# Patient Record
Sex: FEMALE | Race: OTHER | Employment: UNEMPLOYED | URBAN - METROPOLITAN AREA
[De-identification: names, ages, dates, MRNs, and addresses within clinical notes are randomized per-mention and may not be internally consistent; named-entity substitution may affect disease eponyms.]

---
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
## 2023-01-19 ENCOUNTER — HOSPITAL ENCOUNTER (EMERGENCY)
Age: 18
Discharge: HOME | End: 2023-01-19
Attending: STUDENT IN AN ORGANIZED HEALTH CARE EDUCATION/TRAINING PROGRAM

## 2023-01-19 ENCOUNTER — APPOINTMENT (OUTPATIENT)
Dept: ULTRASOUND IMAGING | Age: 18
End: 2023-01-19

## 2023-01-19 VITALS
TEMPERATURE: 98.7 F | OXYGEN SATURATION: 100 % | HEIGHT: 65 IN | BODY MASS INDEX: 39.89 KG/M2 | HEART RATE: 99 BPM | SYSTOLIC BLOOD PRESSURE: 131 MMHG | WEIGHT: 239.42 LBS | RESPIRATION RATE: 16 BRPM | DIASTOLIC BLOOD PRESSURE: 83 MMHG

## 2023-01-19 DIAGNOSIS — O26.891 ABDOMINAL PAIN DURING PREGNANCY IN FIRST TRIMESTER: ICD-10-CM

## 2023-01-19 DIAGNOSIS — Z3A.11 11 WEEKS GESTATION OF PREGNANCY: Primary | ICD-10-CM

## 2023-01-19 DIAGNOSIS — R10.9 ABDOMINAL PAIN DURING PREGNANCY IN FIRST TRIMESTER: ICD-10-CM

## 2023-01-19 DIAGNOSIS — R82.71 ASB (ASYMPTOMATIC BACTERIURIA): ICD-10-CM

## 2023-01-19 LAB
A/G RATIO: 1.1 (ref 1.1–2.2)
ALBUMIN SERPL-MCNC: 4.4 G/DL (ref 3.4–5)
ALP BLD-CCNC: 87 U/L (ref 40–129)
ALT SERPL-CCNC: 52 U/L (ref 10–40)
ANION GAP SERPL CALCULATED.3IONS-SCNC: 16 MMOL/L (ref 3–16)
AST SERPL-CCNC: 29 U/L (ref 15–37)
BACTERIA: ABNORMAL /HPF
BASOPHILS ABSOLUTE: 0 K/UL (ref 0–0.2)
BASOPHILS RELATIVE PERCENT: 0.4 %
BILIRUB SERPL-MCNC: 0.4 MG/DL (ref 0–1)
BILIRUBIN URINE: ABNORMAL
BLOOD, URINE: NEGATIVE
BUN BLDV-MCNC: 8 MG/DL (ref 7–20)
CALCIUM SERPL-MCNC: 10.3 MG/DL (ref 8.3–10.6)
CHLORIDE BLD-SCNC: 101 MMOL/L (ref 99–110)
CLARITY: ABNORMAL
CO2: 17 MMOL/L (ref 21–32)
COLOR: ABNORMAL
CREAT SERPL-MCNC: <0.5 MG/DL (ref 0.6–1.1)
EOSINOPHILS ABSOLUTE: 0 K/UL (ref 0–0.6)
EOSINOPHILS RELATIVE PERCENT: 0.1 %
EPITHELIAL CELLS, UA: ABNORMAL /HPF (ref 0–5)
GFR SERPL CREATININE-BSD FRML MDRD: >60 ML/MIN/{1.73_M2}
GLUCOSE BLD-MCNC: 84 MG/DL (ref 70–99)
GLUCOSE URINE: NEGATIVE MG/DL
GONADOTROPIN, CHORIONIC (HCG) QUANT: NORMAL MIU/ML
HCT VFR BLD CALC: 37.7 % (ref 36–48)
HEMOGLOBIN: 12.9 G/DL (ref 12–16)
HYALINE CASTS: ABNORMAL /LPF (ref 0–2)
KETONES, URINE: >=80 MG/DL
LEUKOCYTE ESTERASE, URINE: NEGATIVE
LYMPHOCYTES ABSOLUTE: 1.8 K/UL (ref 1–5.1)
LYMPHOCYTES RELATIVE PERCENT: 16.9 %
MCH RBC QN AUTO: 27.5 PG (ref 26–34)
MCHC RBC AUTO-ENTMCNC: 34.1 G/DL (ref 31–36)
MCV RBC AUTO: 80.8 FL (ref 80–100)
MICROSCOPIC EXAMINATION: YES
MONOCYTES ABSOLUTE: 0.6 K/UL (ref 0–1.3)
MONOCYTES RELATIVE PERCENT: 5.5 %
NEUTROPHILS ABSOLUTE: 8.3 K/UL (ref 1.7–7.7)
NEUTROPHILS RELATIVE PERCENT: 77.1 %
NITRITE, URINE: NEGATIVE
PDW BLD-RTO: 16 % (ref 12.4–15.4)
PH UA: 6 (ref 5–8)
PLATELET # BLD: 259 K/UL (ref 135–450)
PMV BLD AUTO: 8.9 FL (ref 5–10.5)
POTASSIUM REFLEX MAGNESIUM: 3.8 MMOL/L (ref 3.5–5.1)
PROTEIN UA: 30 MG/DL
RBC # BLD: 4.67 M/UL (ref 4–5.2)
RBC UA: ABNORMAL /HPF (ref 0–4)
SODIUM BLD-SCNC: 134 MMOL/L (ref 136–145)
SPECIFIC GRAVITY UA: >=1.03 (ref 1–1.03)
TOTAL PROTEIN: 8.4 G/DL (ref 6.4–8.2)
URINE REFLEX TO CULTURE: ABNORMAL
URINE TYPE: ABNORMAL
UROBILINOGEN, URINE: 2 E.U./DL
WBC # BLD: 10.7 K/UL (ref 4–11)
WBC UA: ABNORMAL /HPF (ref 0–5)

## 2023-01-19 PROCEDURE — 76801 OB US < 14 WKS SINGLE FETUS: CPT

## 2023-01-19 PROCEDURE — 84702 CHORIONIC GONADOTROPIN TEST: CPT

## 2023-01-19 PROCEDURE — 6370000000 HC RX 637 (ALT 250 FOR IP): Performed by: NURSE PRACTITIONER

## 2023-01-19 PROCEDURE — 99283 EMERGENCY DEPT VISIT LOW MDM: CPT

## 2023-01-19 PROCEDURE — 85025 COMPLETE CBC W/AUTO DIFF WBC: CPT

## 2023-01-19 PROCEDURE — 80053 COMPREHEN METABOLIC PANEL: CPT

## 2023-01-19 PROCEDURE — 81001 URINALYSIS AUTO W/SCOPE: CPT

## 2023-01-19 PROCEDURE — 36415 COLL VENOUS BLD VENIPUNCTURE: CPT

## 2023-01-19 RX ORDER — CEPHALEXIN 500 MG/1
500 CAPSULE ORAL 2 TIMES DAILY
Qty: 14 CAPSULE | Refills: 0 | Status: SHIPPED | OUTPATIENT
Start: 2023-01-19 | End: 2023-01-26

## 2023-01-19 RX ORDER — CEPHALEXIN 500 MG/1
500 CAPSULE ORAL ONCE
Status: COMPLETED | OUTPATIENT
Start: 2023-01-19 | End: 2023-01-19

## 2023-01-19 RX ADMIN — CEPHALEXIN 500 MG: 500 CAPSULE ORAL at 20:36

## 2023-01-19 ASSESSMENT — PAIN - FUNCTIONAL ASSESSMENT
PAIN_FUNCTIONAL_ASSESSMENT: 0-10
PAIN_FUNCTIONAL_ASSESSMENT: NONE - DENIES PAIN

## 2023-01-19 ASSESSMENT — PAIN DESCRIPTION - LOCATION: LOCATION: ABDOMEN

## 2023-01-19 ASSESSMENT — PAIN DESCRIPTION - PAIN TYPE: TYPE: ACUTE PAIN

## 2023-01-19 ASSESSMENT — PAIN DESCRIPTION - ORIENTATION: ORIENTATION: LOWER

## 2023-01-19 ASSESSMENT — PAIN SCALES - GENERAL: PAINLEVEL_OUTOF10: 4

## 2023-01-19 ASSESSMENT — PAIN DESCRIPTION - DESCRIPTORS: DESCRIPTORS: ACHING

## 2023-01-19 NOTE — DISCHARGE INSTRUCTIONS
Return to the emergency department or worsening symptoms including, not limited to, developing fever, chills, sweats, inability to tolerate food or drink, severe lower abdominal pain/pelvic pain, vaginal bleeding, or other symptoms/concerns. Please follow-up with the OB/GYN at the provided number to establish an OB/GYN for your prenatal care. Medication as prescribed ensuring that you complete the full course of the antibiotic-Keflex.

## 2023-01-19 NOTE — ED NOTES
Dr. Jakob Donaldson talked with Carolyn Ruiz NP at [facility]. Patient is getting transferred for DAKOTA Carmichael  01/19/23 7510

## 2023-01-19 NOTE — ED PROVIDER NOTES
Primary Care Physician: No primary care provider on file. Attending Physician: Angelica Hernandez MD     History   Chief Complaint   Patient presents with    Abdominal Pain     C/o lower abdomen pain x 4 days. She reports she is 12 weeks pregnant. She was seen here on 12/5/22 for same thing. She was sent to [facility] for [address] she was 5 weeks pregnant on 12/5/23. She denies any vaginal bleeding. She has not followed up with a OB/GYN        HPI   Vero Maloney  is a 21 y.o. female who presents complaining of left lower quadrant abdominal pain. Patient was seen here early December 2022 when she presented with abdominal pain. Labs were obtained and patient was pregnant. An ultrasound was obtained and was inconclusive. Patient was discharged home. She does have an appointment to see OB/GYN but much ending. She comes in stating that she is having lower abdominal pain. She denies any vaginal bleeding or discharge. No fevers chills but admits to some nausea. History reviewed. No pertinent past medical history. History reviewed. No pertinent surgical history. History reviewed. No pertinent family history. Social History     Socioeconomic History    Marital status: Single     Spouse name: None    Number of children: None    Years of education: None    Highest education level: None   Tobacco Use    Smoking status: Never    Smokeless tobacco: Never   Vaping Use    Vaping Use: Never used   Substance and Sexual Activity    Alcohol use: Never    Drug use: Never   Social History Narrative    ** Merged History Encounter **             Review of Systems   10 total systems reviewed and found to be negative unless otherwise noted in HPI     Physical Exam   /83   Pulse 99   Temp 98.7 °F (37.1 °C) (Tympanic)   Resp 17   Ht 5' 5\" (1.651 m)   Wt (!) 239 lb 6.7 oz (108.6 kg)   LMP 10/27/2022   SpO2 100%   BMI 39.84 kg/m²      Physical Exam  Vitals and nursing note reviewed.    Constitutional: General: She is not in acute distress. Appearance: She is well-developed. He is not diaphoretic. HENT:      Head: Normocephalic and atraumatic. Right Ear: Tympanic membrane normal.      Left Ear: Tympanic membrane normal.      Nose: Nose normal.      Mouth/Throat:      Mouth: Mucous membranes are moist.      Pharynx: Oropharynx is clear. Eyes:      Extraocular Movements: Extraocular movements intact. Conjunctiva/sclera: Conjunctivae normal.      Pupils: Pupils are equal, round, and reactive to light. Cardiovascular:      Rate and Rhythm: Normal rate and regular rhythm. Pulses: Normal pulses. Heart sounds: Normal heart sounds. No murmur heard. No gallop. Pulmonary:      Effort: Pulmonary effort is normal.      Breath sounds: Normal breath sounds. Abdominal:      General: Bowel sounds are normal. There is no distension. Palpations: Abdomen is soft. Tenderness: There is no abdominal tenderness. There is no guarding or rebound. Musculoskeletal:         General: No tenderness. Normal range of motion. Cervical back: Normal range of motion and neck supple. Right foot: Normal.      Left foot: Normal. No swelling, deformity or bony tenderness. Normal pulse. Skin:     General: Skin is warm and dry. Capillary Refill: Capillary refill takes less than 2 seconds. Findings: No bruising or erythema. Neurological:      General: No focal deficit present. Mental Status: She is alert and oriented to person, place, and time.       Gait: Gait normal.     DIAGNOSTIC RESULTS:  LABS:  Labs Reviewed   CBC WITH AUTO DIFFERENTIAL - Abnormal; Notable for the following components:       Result Value    RDW 16.0 (*)     Neutrophils Absolute 8.3 (*)     All other components within normal limits   COMPREHENSIVE METABOLIC PANEL W/ REFLEX TO MG FOR LOW K - Abnormal; Notable for the following components:    Sodium 134 (*)     CO2 17 (*)     Creatinine <0.5 (*)     Total Protein 8.4 (*)     ALT 52 (*)     All other components within normal limits   URINALYSIS WITH REFLEX TO CULTURE - Abnormal; Notable for the following components:    Color, UA DARK YELLOW (*)     Clarity, UA CLOUDY (*)     Bilirubin Urine MODERATE (*)     Ketones, Urine >=80 (*)     Protein, UA 30 (*)     Urobilinogen, Urine 2.0 (*)     All other components within normal limits   MICROSCOPIC URINALYSIS - Abnormal; Notable for the following components:    WBC, UA 6-9 (*)     Epithelial Cells, UA 11-20 (*)     Bacteria, UA Rare (*)     All other components within normal limits   HCG, QUANTITATIVE, PREGNANCY       RADIOLOGY:   Non-plain film images such as CT, Ultrasoundand MRI are read by the radiologist. Plain radiographic images are visualized and preliminarily interpreted by the emergency physician with the below findings:  US OB LESS THAN 14 WEEKS SINGLE OR FIRST GESTATION    (Results Pending)     No results found. ED BEDSIDE ULTRASOUND:   Performed by ED Physician -performed and showed an IUP    EKG: None    All other labs were withinnormal range or not returned as of this dictation       EMERGENCY DEPARTMENT COURSE and DIFFERENTIAL DIAGNOSIS/MDM:   PMH, Surgical Hx, FH, Social Hx reviewed by myself (ETOH usage, Tobacco usage,   Drug usage reviewed by myself, no pertinent Hx)- No Pertinent History     Old records were reviewed by me     Medications - No data to display     PROCEDURES:   Procedures    MDM (ASSESSMENT AND PLAN):  2005, Regis Marie is a 21 y.o. female presents complaining of left lower quadrant pain. On exam patient appears nontoxic in no acute distress but tender to palpation left lower quadrant. Given the fact the patient is pregnant I did perform a bedside ultrasound which showed an IUP with some movements. Given the fact that she is complaining of abdominal pain I wanted an official ultrasound. Patient was sent to [facility] for an official ultrasound.   Labs obtained here and showed that her hCG was significantly high which is reassuring. Nonetheless she was discharged and sent to [facility] for an official ultrasound. DDX-IUP versus ectopic pregnancy versus miscarriage  Social Determinants (Poverty, Education, uninsured) -uninsured poverty  Prior notes-PMD notes reviewed  Name and route all medications-oral Tylenol  Charting interpretations all by myself-   Diagnosis descriptions-moderate pain  Consults- None  Disposition- Considered -      I estimate there is LOW risk for COMPARTMENT SYNDROME, DEEP VENOUS THROMBOSIS, SEPTIC ARTHRITIS, TENDON OR NEUROVASCULAR INJURY, thus I consider the discharge disposition reasonable. NAME@ and I have discussed the diagnosis and risks, and we agree with discharging home to follow-up with their primary doctor or the referral orthopedist. We also discussed returning to the Emergency Department immediately if new or worsening symptoms occur. We have discussed the symptoms which are most concerning (e.g., changing or worsening pain, numbness, weakness) that necessitate immediate return. I PERSONALLY SAW THE PATIENT AND PERFORMED A SUBSTANTIVE PORTION OF THE VISIT INCLUDING ALL ASPECTS OF THE MEDICAL DECISION MAKING PROCESS. The primary clinician of record Cherl Bence, MD    ClINICAL IMPRESSION:  1.  Left upper quadrant abdominal pain          PATIENT REFERRED TO:  Tiffanie Foster MD  [address] [place] [address]    Schedule an appointment as soon as possible for a visit in 1 day      DISCHARGE MEDICATIONS:  New Prescriptions    No medications on file     DISCONTINUED MEDICATIONS:  Discontinued Medications    No medications on file     DISPOSITION Decision To Transfer 01/19/2023 04:44:52 PM    ______________________________________________________________________  __________________________________________________________________________________________  This record is transcribed utilizing voice recognition technology. There are inherent limitations in this technology. In addition, there may be limitations in editing of this report. If there are any discrepancies, please contact me directly.         Nikunj Mittal MD  01/19/23 [place]

## 2023-01-22 ASSESSMENT — ENCOUNTER SYMPTOMS
ABDOMINAL PAIN: 1
ANAL BLEEDING: 0
SORE THROAT: 0
VOMITING: 0
COUGH: 0
EYE PAIN: 0
NAUSEA: 0
DIARRHEA: 0
BACK PAIN: 0
SHORTNESS OF BREATH: 0

## 2023-01-22 NOTE — ED PROVIDER NOTES
[address] [facility]        Pt Name: Charissa Marquez  MRN: [id number]  [place] 2005  Date of evaluation: 1/19/2023  Provider: SHELLEY Conrad CNP  PCP: No primary care provider on file. Note Started: 1:33 AM EST 1/22/23      DAO. I have evaluated this patient. My supervising physician was available for consultation. Patient was seen by Frannie Bass at [facility] and sent to [facility] [place] for transvaginal ultrasound to confirm IUP. CHIEF COMPLAINT       Chief Complaint   Patient presents with    Abdominal Pain     C/o lower abdomen pain x 4 days. She reports she is 12 weeks pregnant. She was seen here on 12/5/22 for same thing. She was sent to [facility] for [address] she was 5 weeks pregnant on 12/5/23. She denies any vaginal bleeding. She has not followed up with a OB/GYN       HISTORY OF PRESENT ILLNESS: 1 or more Elements     History from : Patient    Limitations to history : None    Charissa Marquez is a 21 y.o. nontoxic, well-appearing, mildly distressed female who is sent to the emergency department here at [facility] from the emergency department at [facility] emergency department status post she is pregnant and experiencing a lower abdominal \"cramping\" rated severity of 4/10. She denies nausea, vomiting, headache, visual changes/vision, fever, chills, sweats, urinary symptoms/retention, vaginal bleeding, vaginal discharge, or other symptoms/concerns. LMP 10/27/2022. Nursing Notes were all reviewed and agreed with or any disagreements were addressed in the HPI. REVIEW OF SYSTEMS :      Review of Systems   Constitutional:  Negative for chills, diaphoresis, fatigue and fever. HENT:  Negative for congestion and sore throat. Eyes:  Negative for pain and visual disturbance. Respiratory:  Negative for cough and shortness of breath.     Cardiovascular:  Negative for chest pain and leg swelling. Gastrointestinal:  Positive for abdominal pain. Negative for anal bleeding, diarrhea, nausea and vomiting. Genitourinary:  Negative for difficulty urinating, dysuria, frequency, pelvic pain, urgency, vaginal bleeding, vaginal discharge and vaginal pain. Musculoskeletal:  Negative for back pain and neck pain. Skin:  Negative for rash and wound. Neurological:  Negative for dizziness and light-headedness. Positives and Pertinent negatives as per HPI. SURGICAL HISTORY   History reviewed. No pertinent surgical history. [address]       Discharge Medication List as of 1/19/2023  8:33 PM        CONTINUE these medications which have NOT CHANGED    Details   Prenatal Vit-Fe Fumarate-FA (PRENATAL VITAMIN) 27-0.8 MG TABS Take 1 tablet by mouth daily, Disp-30 tablet, R-0Print             ALLERGIES     Patient has no known allergies. FAMILYHISTORY     History reviewed. No pertinent family history. SOCIAL HISTORY       Social History     Tobacco Use    Smoking status: Never    Smokeless tobacco: Never   Vaping Use    Vaping Use: Never used   Substance Use Topics    Alcohol use: Never    Drug use: Never       SCREENINGS        Pledger Coma Scale  Eye Opening: Spontaneous  Best Verbal Response: Oriented  Best Motor Response: Obeys commands  Devi Coma Scale Score: 15                CIWA Assessment  BP: 131/83  Heart Rate: 99           PHYSICAL EXAM  1 or more Elements     ED Triage Vitals [01/19/23 1611]   BP Temp Temp Source Heart Rate Resp SpO2 Height Weight - Scale   131/83 98.7 °F (37.1 °C) Tympanic 99 17 100 % 5' 5\" (1.651 m) (!) 239 lb 6.7 oz (108.6 kg)       Physical Exam  Vitals and nursing note reviewed. Constitutional:       Appearance: Normal appearance. She is not diaphoretic. HENT:      Head: Normocephalic and atraumatic.       Right Ear: External ear normal.      Left Ear: External ear normal.      Nose: Nose normal.   Eyes:      General:         Right eye: No discharge. Left eye: No discharge. Pulmonary:      Effort: Pulmonary effort is normal. No respiratory distress. Abdominal:      General: Bowel sounds are normal. There is no distension or abdominal bruit. There are no signs of injury. Palpations: Abdomen is soft. Tenderness: There is abdominal tenderness in the suprapubic area. Musculoskeletal:         General: Normal range of motion. Cervical back: Normal range of motion and neck supple. Skin:     General: Skin is warm and dry. Neurological:      Mental Status: She is alert and oriented to person, place, and time. Psychiatric:         Mood and Affect: Mood normal.         Behavior: Behavior normal.         DIAGNOSTIC RESULTS   LABS:    Labs Reviewed   CBC WITH AUTO DIFFERENTIAL - Abnormal; Notable for the following components:       Result Value    RDW 16.0 (*)     Neutrophils Absolute 8.3 (*)     All other components within normal limits   COMPREHENSIVE METABOLIC PANEL W/ REFLEX TO MG FOR LOW K - Abnormal; Notable for the following components:    Sodium 134 (*)     CO2 17 (*)     Creatinine <0.5 (*)     Total Protein 8.4 (*)     ALT 52 (*)     All other components within normal limits   URINALYSIS WITH REFLEX TO CULTURE - Abnormal; Notable for the following components:    Color, UA DARK YELLOW (*)     Clarity, UA CLOUDY (*)     Bilirubin Urine MODERATE (*)     Ketones, Urine >=80 (*)     Protein, UA 30 (*)     Urobilinogen, Urine 2.0 (*)     All other components within normal limits   MICROSCOPIC URINALYSIS - Abnormal; Notable for the following components:    WBC, UA 6-9 (*)     Epithelial Cells, UA 11-20 (*)     Bacteria, UA Rare (*)     All other components within normal limits   HCG, QUANTITATIVE, PREGNANCY       When ordered only abnormal lab results are displayed. All other labs were within normal range or not returned as of this dictation. EKG:  When ordered, EKG's are interpreted by the Emergency Department Physician in the absence of a cardiologist.  Please see their note for interpretation of EKG. RADIOLOGY:   Non-plain film images such as CT, Ultrasound and MRI are read by the radiologist. Plain radiographic images are visualized and preliminarily interpreted by the ED Provider with the below findings:        Interpretation per the Radiologist below, if available at the time of this note:    US OB LESS THAN 14 [facility]   Preliminary Result   Single live intrauterine pregnancy with estimated gestational age of 7   weeks, 4 days by current ultrasound with estimated due date 08/06/2023. Nonvisualization of the right ovary. Unremarkable left ovary. US OB LESS THAN 14 WEEKS SINGLE OR FIRST GESTATION    Result Date: 1/19/2023  EXAMINATION: FIRST TRIMESTER OBSTETRIC ULTRASOUND 1/19/2023 TECHNIQUE: Transabdominal first trimester obstetric pelvic ultrasound was performed. COMPARISON: 12/03/2022 HISTORY: ORDERING SYSTEM PROVIDED HISTORY: Pregnancy with abdominal pain TECHNOLOGIST PROVIDED HISTORY: Reason for exam:->Pregnancy with abdominal pain FINDINGS: Uterus: 11.0 x 8.8 x 6.5 cm Gestational Sac(s):  Single normal appearing gestational sac. No evidence of subchorionic hemorrhage. Yolk Sac:  Present Fetal Pole:  Single fetal pole Crown Rump Length:  4.76 cm Fetal Heart Rate:  161 beats per minute Right ovary: Not visualized Left ovary: 3.8 x 2.3 x 2.7 cm. Normal arterial and venous Doppler flow. Free fluid: No significant free fluid. Measurements: Estimated gestational age by current ultrasound: 5 weeks, 4 days Estimated gestational age by LMP/prior ultrasound: 11 weeks, 5 days Estimated Due Date: 08/06/2023     Single live intrauterine pregnancy with estimated gestational age of 5 weeks, 4 days by current ultrasound with estimated due date 08/06/2023. Nonvisualization of the right ovary. Unremarkable left ovary.            PROCEDURES   Unless otherwise noted below, none Procedures    CRITICAL CARE TIME (.cctime)   0 minutes    PAST MEDICAL HISTORY      has no past medical history on file. Chronic Conditions affecting Care: None    EMERGENCY DEPARTMENT COURSE and DIFFERENTIAL DIAGNOSIS/MDM:   Vitals:    Vitals:    01/19/23 1611 01/19/23 2038   BP: 131/83    Pulse: 99    Resp: 17 16   Temp: 98.7 °F (37.1 °C)    TempSrc: Tympanic    SpO2: 100%    Weight: (!) 239 lb 6.7 oz (108.6 kg)    Height: 5' 5\" (1.651 m)      Alternate diagnoses were considered but less likely based on history and physical.  Considered symptomatic UTI, ectopic pregnancy, tubo-ovarian abscess, ovarian torsion, other. I received a call from Dr. Linda Campbell was at [address] [facility] emergency department regarding Ms. Norris Garcia. She is a 21 y.o. nontoxic, well-appearing, mildly distressed female who is sent to the emergency department here at [facility] from the emergency department at [facility] emergency department status post she is pregnant and experiencing a lower abdominal \"cramping\" rated severity of 4/10. She denies nausea, vomiting, fever, chills, sweats, urinary symptoms/retention, vaginal bleeding, vaginal discharge, or other symptoms/concerns. Labs were obtained at [facility] by Dr. Linda Campbell. Labs reveal:  hCG quant: 62,036 consistent with 11 weeks gestation of pregnancy  CMP: Mild hyponatremia 134, CO2 reduced at 17, without other electrolyte derangement, mild elevation of ALT at 52 but otherwise no elevation LFTs, otherwise unremarkable  Urine reflex to culture: Color dark yellow, clarity cloudy, bilirubin moderate, ketones >= 80, protein 30, urine bilirubin 2.0, without nitrites or leukocyte Estrace. Microscopic urinalysis: WBC 6.9, epithelials 11-20, bacteria rare potentially contaminated but will treat with pregnancy safe antibiotic for asymptomatic bacteriuria due to risk for premature rupture membranes in pregnancy. US OB less than 14 weeks:  As noted above identifies single live intrauterine pregnancy with estimated gestational age of 5 weeks and 4 days by current ultrasound with estimated due date 8/6/2023. Patient medicated emergency department as below. Patient was given the following medications:  Medications   cephALEXin (KEFLEX) capsule 500 mg (500 mg Oral Given 1/19/23 2036)             Is this patient to be included in the SEP-1 Core Measure due to severe sepsis or septic shock? No   Exclusion criteria - the patient is NOT to be included for SEP-1 Core Measure due to: Infection is not suspected    CONSULTS: (Who and What was discussed)  None  Discussion with Other Profesionals : None    Social Determinants : None    Records Reviewed : None    CC/HPI Summary, DDx, ED Course, and Reassessment: Patient resting comfortably on stretcher with eyes open with stable vital signs. She endorses no need for pain medication at this time. Discussed the asymptomatic bacteriuria and need to treat. Disposition Considerations (include 1 Tests not done, Shared Decision Making, Pt Expectation of Test or Tx.): Given that the patient is  Given that the patient does have a confirmed IUP with no vaginal bleeding. She is hemodynamically stable. She is afebrile. Therefore I feel she is both safe and appropriate discharged home with outpatient follow-up. I referred her to OB/GYN doctor Dr. Mortimer Brothers instructed her to call tomorrow to schedule appointment for evaluation. I prescribed Keflex for the asymptomatic bacteriuria. Patient given strict return precautions. Patient verbalized understands agreeable to plan for discharge and follow-up. I did prescribe Keflex as noted below for asymptomatic bacteria. I am the Primary Clinician of Record. FINAL IMPRESSION      1. 11 weeks gestation of pregnancy    2. ASB (asymptomatic bacteriuria)    3.  Abdominal pain during pregnancy in first trimester          DISPOSITION/PLAN     DISPOSITION Decision to Discharge    PATIENT REFERRED TO:  [facility] Emergency Department  [address] [id number]  [phone]  Call in 1 day      Ramya Esparza MD  [address]  [phone]    Call in 1 day      [facility]  [phone]  Call in 1 day        DISCHARGE MEDICATIONS:  Discharge Medication List as of 1/19/2023  8:33 PM        START taking these medications    Details   cephALEXin (KEFLEX) 500 MG capsule Take 1 capsule by mouth 2 times daily for 7 days, Disp-14 capsule, R-0Normal             DISCONTINUED MEDICATIONS:  Discharge Medication List as of 1/19/2023  8:33 PM                 (Please note that portions of this note were completed with a voice recognition program.  Efforts were made to edit the dictations but occasionally words are mis-transcribed.)    SHELLEY Shelton CNP (electronically signed)            SHELLEY Shelton CNP  01/22/23 0150

## 2023-01-23 ENCOUNTER — TELEPHONE (OUTPATIENT)
Dept: GYNECOLOGY | Age: 18
End: 2023-01-23

## 2023-01-23 NOTE — TELEPHONE ENCOUNTER
Patient was seen on Thursday. Was told to schedule follow up with Dr Allison Sheldon. Please advise on scheduling.  Patient can be reached at [phone]

## 2023-01-23 NOTE — TELEPHONE ENCOUNTER
Tell patient that the ER gave her the wrong doctor to follow up with. I cover for patients who are pregnant and having a miscarriage. They saw a heart beat. She needs to establish OB care somewhere.  Some groups she can contact are [facility], [facility], [facility] or she can contact her insurance to help her find an OB/GYN

## 2023-01-25 NOTE — TELEPHONE ENCOUNTER
Patient returned call, gave her information in encounter regarding her follow up, patient understood, I have her places that were suggested told her to call her insurance company to see if they are covered.  Patient understood      done

## 2023-02-02 ENCOUNTER — INITIAL PRENATAL (OUTPATIENT)
Dept: OBGYN CLINIC | Age: 18
End: 2023-02-02
Payer: COMMERCIAL

## 2023-02-02 VITALS
DIASTOLIC BLOOD PRESSURE: 80 MMHG | WEIGHT: 241.8 LBS | BODY MASS INDEX: 40.24 KG/M2 | SYSTOLIC BLOOD PRESSURE: 122 MMHG | HEART RATE: 94 BPM

## 2023-02-02 DIAGNOSIS — Z34.91 PRENATAL CARE IN FIRST TRIMESTER: Primary | ICD-10-CM

## 2023-02-02 DIAGNOSIS — O99.211 MATERNAL OBESITY SYNDROME IN FIRST TRIMESTER: ICD-10-CM

## 2023-02-02 PROBLEM — O99.210 MATERNAL OBESITY SYNDROME: Status: ACTIVE | Noted: 2023-02-02

## 2023-02-02 LAB
BASOPHILS ABSOLUTE: 0.1 K/UL (ref 0–0.2)
BASOPHILS RELATIVE PERCENT: 0.6 %
EOSINOPHILS ABSOLUTE: 0 K/UL (ref 0–0.6)
EOSINOPHILS RELATIVE PERCENT: 0.3 %
HCT VFR BLD CALC: 39.3 % (ref 36–48)
HEMOGLOBIN: 12.7 G/DL (ref 12–16)
LYMPHOCYTES ABSOLUTE: 1.9 K/UL (ref 1–5.1)
LYMPHOCYTES RELATIVE PERCENT: 20.9 %
MCH RBC QN AUTO: 27.4 PG (ref 26–34)
MCHC RBC AUTO-ENTMCNC: 32.3 G/DL (ref 31–36)
MCV RBC AUTO: 84.9 FL (ref 80–100)
MONOCYTES ABSOLUTE: 0.6 K/UL (ref 0–1.3)
MONOCYTES RELATIVE PERCENT: 6.3 %
NEUTROPHILS ABSOLUTE: 6.4 K/UL (ref 1.7–7.7)
NEUTROPHILS RELATIVE PERCENT: 71.9 %
PDW BLD-RTO: 16.7 % (ref 12.4–15.4)
PLATELET # BLD: 264 K/UL (ref 135–450)
PMV BLD AUTO: 10.3 FL (ref 5–10.5)
RBC # BLD: 4.62 M/UL (ref 4–5.2)
WBC # BLD: 8.9 K/UL (ref 4–11)

## 2023-02-02 PROCEDURE — G8427 DOCREV CUR MEDS BY ELIG CLIN: HCPCS | Performed by: OBSTETRICS & GYNECOLOGY

## 2023-02-02 PROCEDURE — G8484 FLU IMMUNIZE NO ADMIN: HCPCS | Performed by: OBSTETRICS & GYNECOLOGY

## 2023-02-02 PROCEDURE — 99203 OFFICE O/P NEW LOW 30 MIN: CPT | Performed by: OBSTETRICS & GYNECOLOGY

## 2023-02-02 PROCEDURE — G8419 CALC BMI OUT NRM PARAM NOF/U: HCPCS | Performed by: OBSTETRICS & GYNECOLOGY

## 2023-02-02 PROCEDURE — 1036F TOBACCO NON-USER: CPT | Performed by: OBSTETRICS & GYNECOLOGY

## 2023-02-02 PROCEDURE — 36415 COLL VENOUS BLD VENIPUNCTURE: CPT | Performed by: OBSTETRICS & GYNECOLOGY

## 2023-02-02 ASSESSMENT — ENCOUNTER SYMPTOMS
VOMITING: 0
DIARRHEA: 0
ABDOMINAL PAIN: 0
CONSTIPATION: 0
NAUSEA: 0

## 2023-02-02 NOTE — ASSESSMENT & PLAN NOTE
- FWB: reassuring by dat today  - Genetic screening: MQS next visit  - Anatomy scan: plan at 20 weeks with MFM  - Flu shot: discuss next visit  - Tdap: 28 weeks  - PNL: sent today

## 2023-02-02 NOTE — ASSESSMENT & PLAN NOTE
Prepregnancy BMI 40  - MFM referral for anatomy scan (sent today)  - Recommend TWG 11-20lbs  - Nutritional counseling  - Early glucose screening, repeat at 24-28 weeks  - q4wk growth US and weekly ANFS at 34 weeks for BMI > 40  - baseline HELLP labs sent today, start daily baby aspirin today as well

## 2023-02-02 NOTE — PROGRESS NOTES
OB History and Physical    CC:   Chief Complaint   Patient presents with    New Patient    Initial Prenatal Visit        HPI: Hamlet Shafer is a 21 y.o. Arunlfo Harries at 13w4d by Lacie Santiago (Estimated Date of Delivery: 23) who presents for new OB visit. Seen in ER for pregnancy confirmation in January, was having abdominal pain at that time which has since resolved. Overall feeling well, no complaints. No VB or cramps. Some nipple tenderness, no nausea/vomiting. Otherwise doing well today without issues. Maternal wellness questionnaire reviewed - no concerns today. Review of Systems:   Review of Systems   Gastrointestinal:  Negative for abdominal pain, constipation, diarrhea, nausea and vomiting. Genitourinary:  Negative for difficulty urinating, dysuria, menstrual problem, vaginal bleeding and vaginal discharge. Obstetrical History:  OB History    Para Term  AB Living   1 0 0 0 0     SAB IAB Ectopic Molar Multiple Live Births   0 0 0 0          # Outcome Date GA Lbr Melvin/2nd Weight Sex Delivery Anes PTL Lv   1 Current                Gynecologic History  Menstrual History:  LMP: unknown  Pap History:  History of abnormal pap smears: n/a  Last pap: n/a  Sexual History:   H/O STI: denies    Medical History:  No past medical history on file. Surgical History:  No past surgical history on file. Medications:  Current Outpatient Medications   Medication Sig Dispense Refill    Prenatal Vit-Fe Fumarate-FA (PRENATAL VITAMIN) 27-0.8 MG TABS Take 1 tablet by mouth daily 30 tablet 0     No current facility-administered medications for this visit.        Allergies:  No Known Allergies    Social History:  Social History     Socioeconomic History    Marital status: Single     Spouse name: None    Number of children: None    Years of education: None    Highest education level: None   Tobacco Use    Smoking status: Never    Smokeless tobacco: Never   Vaping Use    Vaping Use: Never used   Substance and Sexual Activity    Alcohol use: Never    Drug use: Never    Sexual activity: Yes     Partners: Male   Social History Narrative    ** Merged History Encounter **            FamilyHistory:  No family history on file. PhysicalExam:  /80   Pulse 94   Wt (!) 241 lb 12.8 oz (109.7 kg)   LMP 10/27/2022   BMI 40.24 kg/m²   Physical Exam  HENT:      Head: Normocephalic. Cardiovascular:      Rate and Rhythm: Normal rate. Pulmonary:      Effort: Pulmonary effort is normal. No respiratory distress. Abdominal:      Palpations: Abdomen is soft. Tenderness: There is no abdominal tenderness. There is no guarding or rebound. Skin:     General: Skin is warm and dry. Neurological:      General: No focal deficit present. Mental Status: She is alert. Psychiatric:         Mood and Affect: Mood normal.       FHT: 143 bpm via doppler    Labs:  No visits with results within 1 Day(s) from this visit.    Latest known visit with results is:   Admission on 01/19/2023, Discharged on 01/19/2023   Component Date Value    WBC 01/19/2023 10.7     RBC 01/19/2023 4.67     Hemoglobin 01/19/2023 12.9     Hematocrit 01/19/2023 37.7     MCV 01/19/2023 80.8     MCH 01/19/2023 27.5     MCHC 01/19/2023 34.1     RDW 01/19/2023 16.0 (A)     Platelets 73/74/1986 259     MPV 01/19/2023 8.9     Neutrophils % 01/19/2023 77.1     Lymphocytes % 01/19/2023 16.9     Monocytes % 01/19/2023 5.5     Eosinophils % 01/19/2023 0.1     Basophils % 01/19/2023 0.4     Neutrophils Absolute 01/19/2023 8.3 (A)     Lymphocytes Absolute 01/19/2023 1.8     Monocytes Absolute 01/19/2023 0.6     Eosinophils Absolute 01/19/2023 0.0     Basophils Absolute 01/19/2023 0.0     Sodium 01/19/2023 134 (A)     Potassium reflex Magnesi* 01/19/2023 3.8     Chloride 01/19/2023 101     CO2 01/19/2023 17 (A)     Anion Gap 01/19/2023 16     Glucose 01/19/2023 84     BUN 01/19/2023 8     Creatinine 01/19/2023 <0.5 (A)     Est, Glom Filt Rate 01/19/2023 >60 Calcium 01/19/2023 10.3     Total Protein 01/19/2023 8.4 (A)     Albumin 01/19/2023 4.4     Albumin/Globulin Ratio 01/19/2023 1.1     Total Bilirubin 01/19/2023 0.4     Alkaline Phosphatase 01/19/2023 87     ALT 01/19/2023 52 (A)     AST 01/19/2023 29     Color, UA 01/19/2023 DARK YELLOW (A)     Clarity, UA 01/19/2023 CLOUDY (A)     Glucose, Ur 01/19/2023 Negative     Bilirubin Urine 01/19/2023 MODERATE (A)     Ketones, Urine 01/19/2023 >=80 (A)     Specific Gravity, UA 01/19/2023 >=1.030     Blood, Urine 01/19/2023 Negative     pH, UA 01/19/2023 6.0     Protein, UA 01/19/2023 30 (A)     Urobilinogen, Urine 01/19/2023 2.0 (A)     Nitrite, Urine 01/19/2023 Negative     Leukocyte Esterase, Urine 01/19/2023 Negative     Microscopic Examination 01/19/2023 YES     Urine Type 01/19/2023 Voided     Urine Reflex to Culture 01/19/2023 Not Indicated     hCG Quant 01/19/2023 80841.0     Hyaline Casts, UA 01/19/2023 0-2     WBC, UA 01/19/2023 6-9 (A)     RBC, UA 01/19/2023 None seen     Epithelial Cells, UA 01/19/2023 11-20 (A)     Bacteria, UA 01/19/2023 Rare (A)        Most Recent Ultrasound:  Narrative   EXAMINATION:   FIRST TRIMESTER OBSTETRIC ULTRASOUND       1/19/2023       TECHNIQUE:   Transabdominal first trimester obstetric pelvic ultrasound was performed. COMPARISON:   12/03/2022       HISTORY:   ORDERING SYSTEM PROVIDED HISTORY: Pregnancy with abdominal pain   TECHNOLOGIST PROVIDED HISTORY:       Reason for exam:->Pregnancy with abdominal pain       FINDINGS:   Uterus: 11.0 x 8.8 x 6.5 cm       Gestational Sac(s):  Single normal appearing gestational sac. No evidence of   subchorionic hemorrhage. Yolk Sac:  Present       Fetal Pole:  Single fetal pole       Crown Rump Length:  4.76 cm       Fetal Heart Rate:  161 beats per minute       Right ovary: Not visualized       Left ovary: 3.8 x 2.3 x 2.7 cm. Normal arterial and venous Doppler flow. Free fluid: No significant free fluid. Measurements:       Estimated gestational age by current ultrasound: 5 weeks, 4 days       Estimated gestational age by LMP/prior ultrasound: 11 weeks, 5 days       Estimated Due Date: 2023           Impression   Single live intrauterine pregnancy with estimated gestational age of 7   weeks, 4 days by current ultrasound with estimated due date 2023. Nonvisualization of the right ovary. Unremarkable left ovary.                Assessment/Plan:   Aleksandr Higgins is a 21 y.o.  at 13w4d who presents for new OB visit    Problem List Items Addressed This Visit          Other    Prenatal care in first trimester - Primary     - FWB: reassuring by doptones today  - Genetic screening: MQS next visit  - Anatomy scan: plan at 20 weeks with MFM  - Flu shot: discuss next visit  - Tdap: 28 weeks  - PNL: sent today          Relevant Orders    HIV Screen    Hep C AB RLFX HCV PCR-A    Hepatitis B Surface Antigen    CBC with Auto Differential    Drug Screen Multi Urine With Bup    Rubella antibody, IgG    Varicella Zoster Antibody, IgG    Syphilis Antibody Cascading Reflex    Urinalysis with Microscopic    Culture, Urine    POCT urine pregnancy    C.trachomatis N.gonorrhoeae DNA, Urine ([facility] Only)    Type and Screen    Maternal obesity syndrome     Prepregnancy BMI 40  - [facility] referral for anatomy scan (sent today)  - Recommend TWG 11-20lbs  - Nutritional counseling  - Early glucose screening, repeat at 24-28 weeks  - q4wk growth US and weekly ANFS at 34 weeks for BMI > 40  - baseline HELLP labs sent today, start daily baby aspirin today as well          Relevant Orders    CBC with Auto Differential    Comprehensive Metabolic Panel    Protein / Creatinine Ratio, Urine       Dispo: 4 weeks for Dominique Toscano MD

## 2023-02-03 LAB
A/G RATIO: 1.3 (ref 1.1–2.2)
ABO/RH: NORMAL
ALBUMIN SERPL-MCNC: 4 G/DL (ref 3.4–5)
ALP BLD-CCNC: 78 U/L (ref 40–129)
ALT SERPL-CCNC: 59 U/L (ref 10–40)
AMPHETAMINE SCREEN, URINE: NORMAL
ANION GAP SERPL CALCULATED.3IONS-SCNC: 14 MMOL/L (ref 3–16)
ANTIBODY SCREEN: NORMAL
AST SERPL-CCNC: 26 U/L (ref 15–37)
BACTERIA: ABNORMAL /HPF
BARBITURATE SCREEN URINE: NORMAL
BENZODIAZEPINE SCREEN, URINE: NORMAL
BILIRUB SERPL-MCNC: <0.2 MG/DL (ref 0–1)
BILIRUBIN URINE: ABNORMAL
BLOOD, URINE: NEGATIVE
BUN BLDV-MCNC: 6 MG/DL (ref 7–20)
BUPRENORPHINE URINE: NORMAL
CALCIUM OXALATE CRYSTALS: PRESENT
CALCIUM SERPL-MCNC: 9.8 MG/DL (ref 8.3–10.6)
CANNABINOID SCREEN URINE: NORMAL
CHLORIDE BLD-SCNC: 101 MMOL/L (ref 99–110)
CLARITY: ABNORMAL
CO2: 21 MMOL/L (ref 21–32)
COCAINE METABOLITE SCREEN URINE: NORMAL
COLOR: ABNORMAL
CREAT SERPL-MCNC: <0.5 MG/DL (ref 0.6–1.1)
CREATININE URINE: 399.3 MG/DL (ref 28–259)
EPITHELIAL CELLS, UA: 3 /HPF (ref 0–5)
FENTANYL SCREEN, URINE: NORMAL
GFR SERPL CREATININE-BSD FRML MDRD: >60 ML/MIN/{1.73_M2}
GLUCOSE BLD-MCNC: 78 MG/DL (ref 70–99)
GLUCOSE URINE: NEGATIVE MG/DL
HEPATITIS B SURFACE ANTIGEN INTERPRETATION: NORMAL
HEPATITIS C VIRUS AB BY CIA INDEX: 0.05 IV
HEPATITIS C VIRUS AB BY CIA INTERPRETATION: NEGATIVE
HIV AG/AB: NORMAL
HIV ANTIGEN: NORMAL
HIV-1 ANTIBODY: NORMAL
HIV-2 AB: NORMAL
HYALINE CASTS: 0 /LPF (ref 0–8)
KETONES, URINE: ABNORMAL MG/DL
LEUKOCYTE ESTERASE, URINE: NEGATIVE
Lab: NORMAL
METHADONE SCREEN, URINE: NORMAL
MICROSCOPIC EXAMINATION: YES
NITRITE, URINE: NEGATIVE
OPIATE SCREEN URINE: NORMAL
OXYCODONE URINE: NORMAL
PH UA: 5.5 (ref 5–8)
PH UA: 6
PHENCYCLIDINE SCREEN URINE: NORMAL
POTASSIUM SERPL-SCNC: 4.4 MMOL/L (ref 3.5–5.1)
PROTEIN PROTEIN: 55 MG/DL
PROTEIN UA: 30 MG/DL
PROTEIN/CREAT RATIO: 0.1 MG/DL
RBC UA: 2 /HPF (ref 0–4)
RUBELLA ANTIBODY IGG: 61.2 IU/ML
SODIUM BLD-SCNC: 136 MMOL/L (ref 136–145)
SPECIFIC GRAVITY UA: 1.03 (ref 1–1.03)
TOTAL PROTEIN: 7.1 G/DL (ref 6.4–8.2)
TOTAL SYPHILLIS IGG/IGM: NORMAL
URINE TYPE: ABNORMAL
UROBILINOGEN, URINE: 1 E.U./DL
VARICELLA-ZOSTER VIRUS AB, IGG: NORMAL
WBC UA: 0 /HPF (ref 0–5)

## 2023-02-04 LAB
C. TRACHOMATIS DNA ,URINE: NEGATIVE
N. GONORRHOEAE DNA, URINE: NEGATIVE
URINE CULTURE, ROUTINE: NORMAL

## 2023-02-22 ENCOUNTER — TELEPHONE (OUTPATIENT)
Dept: OBGYN CLINIC | Age: 18
End: 2023-02-22

## 2023-02-22 NOTE — TELEPHONE ENCOUNTER
Pt's baby father calling. He states they bought a baby doppler and are unable to get a heartbeat. Pt denies any spotting, bleeding, cramping, pain. Pt able to slightly feel FM. Pt advised to call for any of the above and that relying on the home doppler at 16 weeks is not the best way to check baby well being. Also stated she was told to take ASA and had brought regular ASA, pt advised of need for 81mg baby ASA.  Please advise

## 2023-02-23 ENCOUNTER — HOSPITAL ENCOUNTER (EMERGENCY)
Age: 18
Discharge: HOME | End: 2023-02-23
Attending: STUDENT IN AN ORGANIZED HEALTH CARE EDUCATION/TRAINING PROGRAM
Payer: COMMERCIAL

## 2023-02-23 VITALS
SYSTOLIC BLOOD PRESSURE: 117 MMHG | TEMPERATURE: 98 F | OXYGEN SATURATION: 97 % | HEART RATE: 74 BPM | HEIGHT: 65 IN | WEIGHT: 238.54 LBS | BODY MASS INDEX: 39.74 KG/M2 | RESPIRATION RATE: 20 BRPM | DIASTOLIC BLOOD PRESSURE: 64 MMHG

## 2023-02-23 DIAGNOSIS — O20.0 THREATENED MISCARRIAGE: Primary | ICD-10-CM

## 2023-02-23 LAB
ALBUMIN SERPL-MCNC: 3.6 G/DL (ref 3.4–5)
ALP BLD-CCNC: 70 U/L (ref 40–129)
ALT SERPL-CCNC: 84 U/L (ref 10–40)
ANION GAP SERPL CALCULATED.3IONS-SCNC: 12 MMOL/L (ref 3–16)
AST SERPL-CCNC: 40 U/L (ref 15–37)
BASOPHILS ABSOLUTE: 0 K/UL (ref 0–0.2)
BASOPHILS RELATIVE PERCENT: 0.4 %
BILIRUB SERPL-MCNC: <0.2 MG/DL (ref 0–1)
BILIRUBIN DIRECT: <0.2 MG/DL (ref 0–0.3)
BILIRUBIN URINE: NEGATIVE
BILIRUBIN, INDIRECT: ABNORMAL MG/DL (ref 0–1)
BLOOD, URINE: NEGATIVE
BUN BLDV-MCNC: 7 MG/DL (ref 7–20)
CALCIUM SERPL-MCNC: 9.4 MG/DL (ref 8.3–10.6)
CHLORIDE BLD-SCNC: 107 MMOL/L (ref 99–110)
CLARITY: CLEAR
CO2: 20 MMOL/L (ref 21–32)
COLOR: YELLOW
CREAT SERPL-MCNC: <0.5 MG/DL (ref 0.6–1.1)
EOSINOPHILS ABSOLUTE: 0 K/UL (ref 0–0.6)
EOSINOPHILS RELATIVE PERCENT: 0.7 %
GFR SERPL CREATININE-BSD FRML MDRD: >60 ML/MIN/{1.73_M2}
GLUCOSE BLD-MCNC: 92 MG/DL (ref 70–99)
GLUCOSE URINE: NEGATIVE MG/DL
HCG(URINE) PREGNANCY TEST: POSITIVE
HCT VFR BLD CALC: 34.3 % (ref 36–48)
HEMOGLOBIN: 11.7 G/DL (ref 12–16)
KETONES, URINE: NEGATIVE MG/DL
LEUKOCYTE ESTERASE, URINE: NEGATIVE
LIPASE: 31 U/L (ref 13–60)
LYMPHOCYTES ABSOLUTE: 1.8 K/UL (ref 1–5.1)
LYMPHOCYTES RELATIVE PERCENT: 25.1 %
MCH RBC QN AUTO: 28.1 PG (ref 26–34)
MCHC RBC AUTO-ENTMCNC: 34.2 G/DL (ref 31–36)
MCV RBC AUTO: 82 FL (ref 80–100)
MICROSCOPIC EXAMINATION: NORMAL
MONOCYTES ABSOLUTE: 0.5 K/UL (ref 0–1.3)
MONOCYTES RELATIVE PERCENT: 7.3 %
NEUTROPHILS ABSOLUTE: 4.8 K/UL (ref 1.7–7.7)
NEUTROPHILS RELATIVE PERCENT: 66.5 %
NITRITE, URINE: NEGATIVE
PDW BLD-RTO: 15.9 % (ref 12.4–15.4)
PH UA: 6.5 (ref 5–8)
PLATELET # BLD: 236 K/UL (ref 135–450)
PMV BLD AUTO: 9 FL (ref 5–10.5)
POTASSIUM SERPL-SCNC: 4.2 MMOL/L (ref 3.5–5.1)
PROTEIN UA: NEGATIVE MG/DL
RBC # BLD: 4.18 M/UL (ref 4–5.2)
SODIUM BLD-SCNC: 139 MMOL/L (ref 136–145)
SPECIFIC GRAVITY UA: 1.02 (ref 1–1.03)
TOTAL PROTEIN: 6.9 G/DL (ref 6.4–8.2)
URINE REFLEX TO CULTURE: NORMAL
URINE TYPE: NORMAL
UROBILINOGEN, URINE: 1 E.U./DL
WBC # BLD: 7.2 K/UL (ref 4–11)

## 2023-02-23 PROCEDURE — 81003 URINALYSIS AUTO W/O SCOPE: CPT

## 2023-02-23 PROCEDURE — 6370000000 HC RX 637 (ALT 250 FOR IP): Performed by: STUDENT IN AN ORGANIZED HEALTH CARE EDUCATION/TRAINING PROGRAM

## 2023-02-23 PROCEDURE — 80076 HEPATIC FUNCTION PANEL: CPT

## 2023-02-23 PROCEDURE — 80048 BASIC METABOLIC PNL TOTAL CA: CPT

## 2023-02-23 PROCEDURE — 99284 EMERGENCY DEPT VISIT MOD MDM: CPT

## 2023-02-23 PROCEDURE — 83690 ASSAY OF LIPASE: CPT

## 2023-02-23 PROCEDURE — 84703 CHORIONIC GONADOTROPIN ASSAY: CPT

## 2023-02-23 PROCEDURE — 36415 COLL VENOUS BLD VENIPUNCTURE: CPT

## 2023-02-23 PROCEDURE — 85025 COMPLETE CBC W/AUTO DIFF WBC: CPT

## 2023-02-23 RX ORDER — ACETAMINOPHEN 500 MG
1000 TABLET ORAL ONCE
Status: COMPLETED | OUTPATIENT
Start: 2023-02-23 | End: 2023-02-23

## 2023-02-23 RX ADMIN — ACETAMINOPHEN 1000 MG: 500 TABLET ORAL at 21:56

## 2023-02-23 ASSESSMENT — PAIN SCALES - GENERAL
PAINLEVEL_OUTOF10: 0

## 2023-02-23 ASSESSMENT — PAIN - FUNCTIONAL ASSESSMENT
PAIN_FUNCTIONAL_ASSESSMENT: NONE - DENIES PAIN
PAIN_FUNCTIONAL_ASSESSMENT: 0-10

## 2023-02-23 ASSESSMENT — PAIN DESCRIPTION - DESCRIPTORS: DESCRIPTORS: CRAMPING

## 2023-02-23 NOTE — TELEPHONE ENCOUNTER
LM for pt to call the office. Neck Surgery   (thyroidectomy, parotidectomy, neck dissection, submandibular gland excision)  Post-operative Instructions       Please call [phone] for any questions and/or the following symptoms: fever >101.5 F, increased pain or swelling of the incision site, difficulty talking, breathing, or swallowing.     Your surgeon may place a small plastic tube in your neck called a drain.  If you go home with a drain please call the office the following morning to let the nursing staff know how much fluid is in the bulb of the drain.  They will direct you when to come into the office to have it removed.     You may notice swelling, firmness, or bruising at your incision site.  It is common for people to feel a tightness or pulling feeling at the incision when turning their head.  There may be sutures, staples or surgical glue holding the incision together.  You may clean any scabs or crusts that form on the incision with hydrogen peroxide.  After 72 hours you may get the incision wet in the shower, pat dry and apply Vaseline or a topical antibiotic ointment like bacitracin. You may apply an ice pack to the incision to help with the pain.  It is normal to have numbness around your incision site.     Please avoid strenuous exercise, and heavy lifting for 2 weeks after surgery. You may notice stiffness, or soreness in your shoulders, back or neck.  Some patients will develop tension headaches.  These symptoms can be treated with a heating pad and light massage to the affected areas.  You may notice a sore throat from the breathing tube that was placed during your surgery.  Warm or cool liquids, throat lozenges may be used for relief.  You may start driving once you are no longer taking narcotic pain medications.     Usually you may return to your regular diet after surgery.  If there is an exception your surgeon will let you know on your discharge paperwork from the hospital.     Your surgeon will indicate what home  medications to resume on your discharge paperwork from the hospital.  You may be given a prescription for narcotic pain medication (Norco, Vicodin, hydrocodone, oxycodone).  Do not drive, operate heavy equipment, climb a ladder, or drink alcohol when taking these medications.  You do not have to use these medications if you don't feel that you need them. If you are using narcotic pain medications monitor how much you are using and what you have left before the weekend.  It is easier to manage prescription refills from your surgeon during regular office hours than contacting the after hours on call surgeon.    Narcotic pain medications can cause constipation.  Stool softeners (Colace), fiber (fruits, bran, metamucil, vegetables), and fluids may help.  Non-steroidal anti-inflammatory drugs (ibuprofen, Advil, Motrin, Aleve) or acetaminophen (Tylenol) are helpful for the pain after neck surgery and may be taken.  Some of the prescription narcotic pain medications also contain acetaminophen (Tylenol).  To avoid taking too much acetaminophen take one or the other, not both.       Wound/Incision Care              Post treatment Instructions    1. Please clean the wound starting on the third post-op day, twice a day for 5 to 7 days.  Gently wash the area with soap and water and carefully pat dry.  After the area is dried, apply a thin layer of Vaseline with a Q-tip.  If the wound is in the mouth, do not follow the above instructions. Brushing the teeth is allowed after surgery, but please be careful not to hit the surgical site.      2. An appointment should be made for stitch removal in 5 to 7 days, unless otherwise arranged.     3. If you were given antibiotics, please take as directed until complete.     4. After stitches are removed, continue to gently clean and dry the wound until the area is completely healed.     5. Please watch for signs and symptoms of infection, including: localized redness increasing in size,  swelling, increasing pain, drainage, or heat.     6. When the wound area is exposed to the sun, a sunscreen with an SPF of at least 45 should be used for at least 6 months.   Your last dose of pain medication was at________________    Please call one of our nurses if you note bleeding, excessive pain, and/or fever in the post-operative period. Additionally, should you have any other questions or concerns please feel free to contact our office.    Scooter Penn MD  [address], [address]  [address]  [facility] [address]  Phone: [phone]  Toll Free: [phone]  Fax: [phone]  Home Instruction Sheet  ANESTHESIA for ADULT       What are the side effects?  Side effects depend on the medication used, and may not even be present.    Most Common Sometimes   Irritability  Poor Balance  Sleeping for Several Hours  Drowsiness  Fatigue  Difficulty Concentrating Change in Behavior  Hyperactivity  Nausea (upset stomach)  Gas (flatulence)  Dizziness  Hiccups  Constipation  Blurred Vision     The effects of sedation medicine can last up to 24 hours.  You may be drowsy or irritable for 2 to 8 hours after receiving medicine.  You may need to sleep after leaving the testing area.  Sleeping after sedation will help reduce irritability.  Diet:  Do not give anything to eat or drink until you are fully awake.  Eat a light meal and advance to a normal diet unless instructed differently:   Do not drink alcohol beverages for the next 24 hours.  Avoid greasy or spicy foods today.  Activity:  You may be dizzy and/or unsteady.  Ask for help walking, using the bathroom, or stairs to protect yourself from injury.  You should not drive a vehicle, operate machinery or power tools for 24 hours because your reflexes may be slow and your vision may be blurry.  Do not sign any legally binding documents or make important decisions for 24 hours after receiving sedation.  Medications:   Unless told otherwise, do not take  any non-prescription medications (cold medicine, etc.) for 24 hours, as these medications in combination with sedation medication, can cause increased drowsiness.  If you feel that you need over the counter medication, discuss with your physician.    If you are currently taking or are on Hormonal Contraceptives , these may be ineffective for the next 8 days following anesthesia due to interactions with medications that you may have received during surgery.  Please use additional (back up) contraception during this time.      Examples of hormonal Contraceptive:   -Birth Control Pills (oral)   -Birth Control Shots (injectable)   -Implantable contraceptives   -Patches   -Vaginal Rings      When Should I Call the Doctor?  Vomiting more than twice.  Extreme irritability or unusual changes in behavior.  Trouble arousing.  Inability to urinate.  Trouble breathing - call 911.    We would like to take this opportunity to thank you. Because your confidence in your caregivers is very important to us, it is our commitment to always treat you and your family with courtesy and respect. Our goal is to always answer any questions or worries or concerns you may have about the care you received.  If you have any suggestions for improvement or worries or concerns please do not hesitate to let us know.

## 2023-02-23 NOTE — TELEPHONE ENCOUNTER
Can we call to check on patient today? OK to add on for visit this afternoon to obtain doptones if she would like, but agree home dopplers are not reliable and I would not recommend their use this early in pregnancy. Thanks. Also agree on 81mg ASA.

## 2023-02-24 NOTE — ED PROVIDER NOTES
[facility] ENCOUNTER        Pt Name: Hamlet Shafer  MRN: [id number]  [place] 2005  Date of evaluation: 2/23/2023  Provider: Adal Nino MD  PCP: No primary care provider on file. Note Started: 7:43 AM EST 2/24/23    CHIEF COMPLAINT       Chief Complaint   Patient presents with    Vaginal Bleeding     Pt is 16 weeks pregnant started with vaginal bleeding yesterday. Pt states small amount of bright blood when she wipes. States she having a little bit of cramping since found out she was pregnant. Denies urinary symptoms. HISTORY OF PRESENT ILLNESS: 1 or more Elements     History from : Patient    Limitations to history : None    Hamlet Shafer is a 21 y.o. female who presents with vaginal bleeding x 1 day, described as spotting only. Hx of Rh +ve. Approx 16 wks pregnant. Had mild cramping yesterday and no longer. Nursing Notes were all reviewed and agreed with or any disagreements were addressed in the HPI. REVIEW OF SYSTEMS :      Positives and Pertinent negatives as per HPI. ROS otherwise unremarkable. SURGICAL HISTORY   None pertinent. [address]       Discharge Medication List as of 2/23/2023 11:21 PM        CONTINUE these medications which have NOT CHANGED    Details   Prenatal Vit-Fe Fumarate-FA (PRENATAL VITAMIN) 27-0.8 MG TABS Take 1 tablet by mouth daily, Disp-30 tablet, R-0Print             ALLERGIES     Patient has no known allergies. FAMILYHISTORY     Denies pertinent.      SOCIAL HISTORY       Social History     Tobacco Use    Smoking status: Never    Smokeless tobacco: Never   Vaping Use    Vaping Use: Never used   Substance Use Topics    Alcohol use: Never    Drug use: Never       SCREENINGS        Devi Coma Scale  Eye Opening: Spontaneous  Best Verbal Response: Oriented  Best Motor Response: Obeys commands  [place] Coma Scale Score: 15                CIWA Assessment  BP: 117/64  Heart Rate: 74           PHYSICAL EXAM  1 or more Elements     ED Triage Vitals [02/23/23 2114]   BP Temp Temp Source Heart Rate Resp SpO2 Height Weight - Scale   (!) 140/73 97.9 °F (36.6 °C) Oral 96 16 97 % 5' 5.35\" (1.66 m) (!) 238 lb 8.6 oz (108.2 kg)       Physical Exam    General: Alert, No acute distress. Eye: Normal conjunctiva. Sclera anicteric. HENT: Oral mucosa is moist.  Respiratory: Respirations even and non-labored. Cardiovascular: Normal rate, Regular rhythm. Intact peripheral pulses. Gastrointestinal: Non-distended. Soft, nttp. Fundus c/w dates. Musculoskeletal: No deformities  Integumentary: Warm, Dry. Neurologic:  No focal deficits. DIAGNOSTIC RESULTS   LABS:    Labs Reviewed   HEPATIC FUNCTION PANEL - Abnormal; Notable for the following components:       Result Value    ALT 84 (*)     AST 40 (*)     All other components within normal limits   CBC WITH AUTO DIFFERENTIAL - Abnormal; Notable for the following components:    Hemoglobin 11.7 (*)     Hematocrit 34.3 (*)     RDW 15.9 (*)     All other components within normal limits   BASIC METABOLIC PANEL - Abnormal; Notable for the following components:    CO2 20 (*)     Creatinine <0.5 (*)     All other components within normal limits   URINALYSIS WITH REFLEX TO CULTURE   PREGNANCY, URINE   LIPASE       When ordered only abnormal lab results are displayed. All other labs were within normal range or not returned as of this dictation. Point of care pregnancy exam  Procedure note:  Indication: qualitative hCG positive, quantitative hCG positive, pregnant by history, abdominal pain, pelvic pain, back pain, vaginal bleeding, syncope, hypotension, trauma, educational    Views:  Transabdominal sagittal: Adequate  Transabdominal transverse: Adequate    Images obtained with findings:   Intrauterine pregnancy: Present  Fetus present.   Fetal heart rate: 130 bpm  Fetal motion: Present    Impression:   Live intrauterine pregnancy: Present     Images obtained by me, interpreted by me. Images were saved to ultrasound machine. PAST MEDICAL HISTORY     None pertinent. EMERGENCY DEPARTMENT COURSE and DIFFERENTIAL DIAGNOSIS/MDM:   Vitals:    Vitals:    02/23/23 2114 02/23/23 2326   BP: (!) 140/73 117/64   Pulse: 96 74   Resp: 16 20   Temp: 97.9 °F (36.6 °C) 98 °F (36.7 °C)   TempSrc: Oral Oral   SpO2: 97%    Weight: (!) 238 lb 8.6 oz (108.2 kg)    Height: 5' 5.35\" (1.66 m)        Patient was given the following medications:  Medications   acetaminophen (TYLENOL) tablet 1,000 mg (1,000 mg Oral Given 2/23/23 2156)             Is this patient to be included in the SEP-1 Core Measure due to severe sepsis or septic shock? No   Exclusion criteria - the patient is NOT to be included for SEP-1 Core Measure due to:  2+ SIRS criteria are not met    Chronic Conditions: none    CONSULTS: (Who and What was discussed)  None                CC/HPI Summary, DDx, ED Course, and Reassessment: 20 yo F with vaginal spotting and cramping, now resolved, occurred yesterday, has established IUP, not ectopic, c/w threatened miscarriage, pt c/f this as unable to doppler her FHTs at home herself. POCUS of the pregnancy is reassuring. Pt is HDS, no pain, no active bleeding noted. Labwork, UA reassuring, mild transaminitis will need follow up, pt informed. No PLT abnl, not time course for preeclampsia and pt not hypertensive. Gave pelvic rest precautions, anticipatory guidance, return precautions. Pt is Rh +ve therefore need for Rhogam considered but not indicated. Recommended follow up with OB closely this week. Pt voiced understanding.       I estimate there is LOW risk for (including but not limited to) ACUTE APPENDICITIS, BOWEL OBSTRUCTION, ACUTE CHOLECYSTITIS, RUPTURED DIVERTICULITIS, INCARCERATED or STRANGULATED HERNIA, HEMMORHAGIC PANCREATITIS, PERFORATED BOWEL/ULCER, ECTOPIC PREGNANCY, OVARIAN TORSION or TUBO-OVARIAN ABSCESS thus I consider the discharge disposition reasonable. Osorio Harden (or their surrogate) and I have discussed the diagnosis and risks, and we agree with discharging home with close follow-up. We also discussed returning to the Emergency Department immediately if new or worsening symptoms occur. We have discussed the symptoms which are most concerning that necessitate immediate return. Disposition Considerations (tests considered but not done, Shared Decision Making, Pt Expectation of Test or Tx.):         I am the Primary Clinician of Record. FINAL IMPRESSION      1.  Threatened miscarriage          DISPOSITION/PLAN     DISPOSITION Decision To Discharge 02/23/2023 11:18:24 PM      PATIENT REFERRED TO:  [facility] [id number]  [phone]    If symptoms worsen    Kelly Smith MD  [facility] [address] [place] [facility]  [phone]    Schedule an appointment as soon as possible for a visit in 1 day        DISCHARGE MEDICATIONS:  Discharge Medication List as of 2/23/2023 11:21 PM          DISCONTINUED MEDICATIONS:  Discharge Medication List as of 2/23/2023 11:21 PM                 (Please note that portions of this note were completed with a voice recognition program.  Efforts were made to edit the dictations but occasionally words are mis-transcribed.)    Domingo Robin MD (electronically signed)            Domingo Robin MD  02/25/23 2227

## 2023-02-24 NOTE — ED TRIAGE NOTES
Pt states she is 16 weeks pregnant and has intermittent bleeding since yesterday. Small amount bright red blood when she wipes. Intermittent cramping since she found out she was pregnant, none right now. Pt denies urinary symptoms.

## 2023-02-27 SDOH — ECONOMIC STABILITY: FOOD INSECURITY: WITHIN THE PAST 12 MONTHS, YOU WORRIED THAT YOUR FOOD WOULD RUN OUT BEFORE YOU GOT MONEY TO BUY MORE.: NEVER TRUE

## 2023-02-27 SDOH — ECONOMIC STABILITY: TRANSPORTATION INSECURITY
IN THE PAST 12 MONTHS, HAS LACK OF TRANSPORTATION KEPT YOU FROM MEETINGS, WORK, OR FROM GETTING THINGS NEEDED FOR DAILY LIVING?: PATIENT DECLINED

## 2023-02-27 SDOH — ECONOMIC STABILITY: HOUSING INSECURITY
IN THE LAST 12 MONTHS, WAS THERE A TIME WHEN YOU DID NOT HAVE A STEADY PLACE TO SLEEP OR SLEPT IN A SHELTER (INCLUDING NOW)?: NO

## 2023-02-27 SDOH — ECONOMIC STABILITY: FOOD INSECURITY: WITHIN THE PAST 12 MONTHS, THE FOOD YOU BOUGHT JUST DIDN'T LAST AND YOU DIDN'T HAVE MONEY TO GET MORE.: NEVER TRUE

## 2023-02-27 SDOH — ECONOMIC STABILITY: INCOME INSECURITY: HOW HARD IS IT FOR YOU TO PAY FOR THE VERY BASICS LIKE FOOD, HOUSING, MEDICAL CARE, AND HEATING?: NOT HARD AT ALL

## 2023-03-02 ENCOUNTER — ROUTINE PRENATAL (OUTPATIENT)
Dept: OBGYN CLINIC | Age: 18
End: 2023-03-02
Payer: COMMERCIAL

## 2023-03-02 VITALS
WEIGHT: 235 LBS | SYSTOLIC BLOOD PRESSURE: 114 MMHG | DIASTOLIC BLOOD PRESSURE: 80 MMHG | BODY MASS INDEX: 38.68 KG/M2 | HEART RATE: 94 BPM

## 2023-03-02 DIAGNOSIS — O99.212 MATERNAL OBESITY SYNDROME IN SECOND TRIMESTER: ICD-10-CM

## 2023-03-02 DIAGNOSIS — Z34.92 PRENATAL CARE IN SECOND TRIMESTER: Primary | ICD-10-CM

## 2023-03-02 PROCEDURE — G8419 CALC BMI OUT NRM PARAM NOF/U: HCPCS | Performed by: OBSTETRICS & GYNECOLOGY

## 2023-03-02 PROCEDURE — G8484 FLU IMMUNIZE NO ADMIN: HCPCS | Performed by: OBSTETRICS & GYNECOLOGY

## 2023-03-02 PROCEDURE — 36415 COLL VENOUS BLD VENIPUNCTURE: CPT | Performed by: OBSTETRICS & GYNECOLOGY

## 2023-03-02 PROCEDURE — 1036F TOBACCO NON-USER: CPT | Performed by: OBSTETRICS & GYNECOLOGY

## 2023-03-02 PROCEDURE — 99213 OFFICE O/P EST LOW 20 MIN: CPT | Performed by: OBSTETRICS & GYNECOLOGY

## 2023-03-02 PROCEDURE — G8427 DOCREV CUR MEDS BY ELIG CLIN: HCPCS | Performed by: OBSTETRICS & GYNECOLOGY

## 2023-03-02 NOTE — ASSESSMENT & PLAN NOTE
Prepregnancy BMI 40  - MFM referral for anatomy scan   - Recommend TWG 11-20lbs  - Nutritional counseling  - Early glucose screening today, repeat at 24-28 weeks  - q4wk growth US and weekly ANFS at 34 weeks for BMI > 40  - baseline HELLP labs wnl, on daily baby aspirin as well

## 2023-03-02 NOTE — PROGRESS NOTES
Return OB Office Visit    CC:   Chief Complaint   Patient presents with    Routine Prenatal Visit       HPI:  Pt seen and examined. No concerns/complaints. Denies VB, LOF, some mild cramps. Some small fetal movements/flutters. Patient was seen in the ER on 2/23/23 for bleeding, was just a small amount of spotting. Nothing since then. US normal at that time. Maternal wellness questionnaire reviewed - no concerns today. Score 2.      Objective:  /80   Pulse 94   Wt (!) 235 lb (106.6 kg)   LMP 10/27/2022   BMI 38.68 kg/m²   Gen: AO, NAD  Abd: Soft, NT  FHT: 141  FH: below U, unk by [employer]  Ext: Mild LE edema    Assessment/Plan:  21 y.o. Kaylee Patton at 17w4d (Estimated Date of Delivery: 8/6/23) presents for JACOBY appointment: \    Problem List Items Addressed This Visit          Other    Prenatal care in second trimester - Primary     - FWB: reassuring by dat today  - Genetic screening: MQS sent today  - Anatomy scan: plan at 20 weeks with M -- scheduled 3/16/23  - Flu shot: discuss next visit  - Tdap: 28 weeks  - PNL: O+/ab-, RI, HepB/C neg, HIVnr, RPRnr, VZV immune, UDS neg, Hgb , GCCT neg, UCx no growth          Relevant Orders    MATERNAL SCREEN 4    Maternal obesity syndrome     Prepregnancy BMI 40  - M referral for anatomy scan   - Recommend TWG 11-20lbs  - Nutritional counseling  - Early glucose screening today, repeat at 24-28 weeks  - q4wk growth US and weekly ANFS at 34 weeks for BMI > 40  - baseline HELLP labs wnl, on daily baby aspirin as well          Relevant Orders    GLUCOSE CHALLENGE GESTATIONAL       Dispo: RTC in 4 weeks  Amanda Johnson MD

## 2023-03-02 NOTE — PROGRESS NOTES
.eastobgynglucose    Patient started drink at  1116a and finished at  1117a  . Patient tolerated drink well.   1 green tube drawn at 1217 . # 50 grams of Glucola. No complain of nausea and vomiting at this time, will continue to monitor.

## 2023-03-02 NOTE — ASSESSMENT & PLAN NOTE
- FWB: reassuring by dat today  - Genetic screening: MQS sent today  - Anatomy scan: plan at 20 weeks with MFM -- scheduled 3/16/23  - Flu shot: discuss next visit  - Tdap: 28 weeks  - PNL: O+/ab-, RI, HepB/C neg, HIVnr, RPRnr, VZV immune, UDS neg, Hgb , GCCT neg, UCx no growth

## 2023-03-03 LAB — GLUCOSE CHALLENGE: 108 MG/DL

## 2023-03-05 LAB
AFP INTERPRETATION: NORMAL
AFP MOM: 1
AFP SPECIMEN: NORMAL
D-INHIBIN: 97 PG/ML
DATING: NORMAL
EER MATERNAL SCREEN AFP, HCG, EST, INH: NORMAL
ESTIMATED DUE DATE: NORMAL
FETUS COUNT: NORMAL
GESTATIONAL AGE CALC AT COLLECT: NORMAL
HISTORY OF ANEUPLOIDY?: NO
HISTORY/NEURAL TUBE DEFECTS: NO
INSULIN DEP. DIABETIC: NO
MATERNAL AGE AT EDD: 19.7 YR
MATERNAL WEIGHT: NORMAL
MOM FOR HCG, 2ND TRIMESTER: 0.63
MOM FOR UE3: 1.05
MOM INHIBN: 0.84
PATIENT'S HCG, 2ND TRIMESTER: NORMAL IU/L
PT AFP: 34 NG/ML
PT UE3: 1.31 NG/ML
RACE: NORMAL
SMOKING: NO

## 2023-03-23 ENCOUNTER — ROUTINE PRENATAL (OUTPATIENT)
Dept: OBGYN CLINIC | Age: 18
End: 2023-03-23
Payer: COMMERCIAL

## 2023-03-23 VITALS
SYSTOLIC BLOOD PRESSURE: 108 MMHG | DIASTOLIC BLOOD PRESSURE: 62 MMHG | WEIGHT: 237.8 LBS | HEART RATE: 91 BPM | BODY MASS INDEX: 39.14 KG/M2

## 2023-03-23 DIAGNOSIS — O99.212 MATERNAL OBESITY SYNDROME IN SECOND TRIMESTER: ICD-10-CM

## 2023-03-23 DIAGNOSIS — Z34.92 PRENATAL CARE IN SECOND TRIMESTER: Primary | ICD-10-CM

## 2023-03-23 PROCEDURE — 1036F TOBACCO NON-USER: CPT | Performed by: OBSTETRICS & GYNECOLOGY

## 2023-03-23 PROCEDURE — G8428 CUR MEDS NOT DOCUMENT: HCPCS | Performed by: OBSTETRICS & GYNECOLOGY

## 2023-03-23 PROCEDURE — 99213 OFFICE O/P EST LOW 20 MIN: CPT | Performed by: OBSTETRICS & GYNECOLOGY

## 2023-03-23 PROCEDURE — G8419 CALC BMI OUT NRM PARAM NOF/U: HCPCS | Performed by: OBSTETRICS & GYNECOLOGY

## 2023-03-23 PROCEDURE — G8484 FLU IMMUNIZE NO ADMIN: HCPCS | Performed by: OBSTETRICS & GYNECOLOGY

## 2023-03-23 NOTE — ASSESSMENT & PLAN NOTE
- FWB: reassuring by dat today  - Genetic screening: MQS low risk  - Anatomy scan: 3/15/23: 283g, nl anatomy (suboptimal n/l, AA, DA, 3VV, hands), CL 3.17cm, nl fluid, posterior placenta/no previa  - Tdap: 28 weeks  - PNL: O+/ab-, RI, HepB/C neg, HIVnr, RPRnr, VZV immune, UDS neg, Hgb , GCCT neg, UCx no growth

## 2023-03-23 NOTE — PROGRESS NOTES
Return OB Office Visit    CC:   Chief Complaint   Patient presents with    Routine Prenatal Visit       HPI:  Pt seen and examined. No concerns/complaints. Denies VB, LOF, ctx. +FM. Maternal wellness questionnaire reviewed - no concerns today.      Objective:  /62   Pulse 91   Wt (!) 237 lb 12.8 oz (107.9 kg)   LMP 10/27/2022   BMI 39.14 kg/m²   Gen: AO, NAD  Abd: Soft, NT  FHT: 139  FH: at U, unk by [employer] [place] [facility] [employer]  Ext: Mild LE edema    Assessment/Plan:  21 y.o. Howard Christian at 20w4d (Estimated Date of Delivery: 8/6/23) presents for JACOBY appointment:     Problem List Items Addressed This Visit          Other    Prenatal care in second trimester - Primary     - FWB: reassuring by dat today  - Genetic screening: MQS low risk  - Anatomy scan: 3/15/23: 283g, nl anatomy (suboptimal n/l, AA, DA, 3VV, hands), CL 3.17cm, nl fluid, posterior placenta/no previa  - Tdap: 28 weeks  - PNL: O+/ab-, RI, HepB/C neg, HIVnr, RPRnr, VZV immune, UDS neg, Hgb , GCCT neg, UCx no growth          Maternal obesity syndrome     Prepregnancy BMI 40  - MFM referral for anatomy scan  - see above  - Recommend TWG 11-20lbs  - Nutritional counseling  - Early glucose screening 108, repeat at 24-28 weeks  - q4wk growth US and weekly ANFS at 34 weeks for BMI > 40  - baseline HELLP labs wnl, on daily baby aspirin as well             Dispo: RTC in 4 weeks with f/u Ran Shelley MD

## 2023-03-23 NOTE — ASSESSMENT & PLAN NOTE
Prepregnancy BMI 40  - MFM referral for anatomy scan  - see above  - Recommend TWG 11-20lbs  - Nutritional counseling  - Early glucose screening 108, repeat at 24-28 weeks  - q4wk growth US and weekly ANFS at 34 weeks for BMI > 40  - baseline HELLP labs wnl, on daily baby aspirin as well

## 2023-04-20 ENCOUNTER — ROUTINE PRENATAL (OUTPATIENT)
Dept: OBGYN CLINIC | Age: 18
End: 2023-04-20
Payer: COMMERCIAL

## 2023-04-20 VITALS
DIASTOLIC BLOOD PRESSURE: 84 MMHG | BODY MASS INDEX: 38.98 KG/M2 | SYSTOLIC BLOOD PRESSURE: 114 MMHG | HEART RATE: 92 BPM | WEIGHT: 236.8 LBS

## 2023-04-20 DIAGNOSIS — Z34.92 PRENATAL CARE IN SECOND TRIMESTER: Primary | ICD-10-CM

## 2023-04-20 DIAGNOSIS — O99.212 MATERNAL OBESITY SYNDROME IN SECOND TRIMESTER: ICD-10-CM

## 2023-04-20 PROCEDURE — 99213 OFFICE O/P EST LOW 20 MIN: CPT | Performed by: OBSTETRICS & GYNECOLOGY

## 2023-04-20 NOTE — ASSESSMENT & PLAN NOTE
- FWB: reassuring by dat today  - Genetic screening: S low risk  - Anatomy scan: 3/15/23: 283g, nl anatomy (suboptimal n/l, AA, DA, 3VV, hands), CL 3.17cm, nl fluid, posterior placenta/no previa    - 4/20/23: 713g (40%ile), suboptimal AA, DA, 3VV  - Tdap: 28 weeks  - PNL: O+/ab-, RI, HepB/C neg, HIVnr, RPRnr, VZV immune, UDS neg, Hgb , GCCT neg, UCx no growth    - CBC/GTT next visit

## 2023-04-20 NOTE — ASSESSMENT & PLAN NOTE
Prepregnancy BMI 40  - MFM referral for anatomy scan  - see above  - Recommend TWG 11-20lbs  - Nutritional counseling  - Early glucose screening 108, repeat at next visit  - q4wk growth US and weekly ANFS at 34 weeks for BMI > 40  - baseline HELLP labs wnl, on daily baby aspirin as well

## 2023-04-20 NOTE — PROGRESS NOTES
Return OB Office Visit    CC:   Chief Complaint   Patient presents with    Routine Prenatal Visit       HPI:  Pt seen and examined. No concerns/complaints. Denies VB, LOF, ctx. +FM. Objective:  /84   Pulse 92   Wt (!) 236 lb 12.8 oz (107.4 kg)   LMP 10/27/2022   BMI 38.98 kg/m²   Gen: AO, NAD  Abd: Soft, NT  FHT: 133    Assessment/Plan:  21 y.o.  at 24w4d (Estimated Date of Delivery: 23) presents for Claudine Morgan [id number] appointment: .     Problem List Items Addressed This Visit          Other    Prenatal care in second trimester - Primary     - FWB: reassuring by doptones today  - Genetic screening: MQS low risk  - Anatomy scan: 3/15/23: 283g, nl anatomy (suboptimal n/l, AA, DA, 3VV, hands), CL 3.17cm, nl fluid, posterior placenta/no previa    - 23: 713g (40%ile), suboptimal AA, DA, 3VV  - Tdap: 28 weeks  - PNL: O+/ab-, RI, HepB/C neg, HIVnr, RPRnr, VZV immune, UDS neg, Hgb , GCCT neg, UCx no growth    - CBC/GTT next visit          Maternal obesity syndrome     Prepregnancy BMI 40  - MFM referral for anatomy scan  - see above  - Recommend TWG 11-20lbs  - Nutritional counseling  - Early glucose screening 108, repeat at next visit  - q4wk growth US and weekly ANFS at 34 weeks for BMI > 40  - baseline HELLP labs wnl, on daily baby aspirin as well             Dispo: RTC in 4 weeks, GTT/CBC, Tdap, Lela Joyner MD

## 2023-05-15 ENCOUNTER — ROUTINE PRENATAL (OUTPATIENT)
Dept: OBGYN CLINIC | Age: 18
End: 2023-05-15
Payer: COMMERCIAL

## 2023-05-15 VITALS
DIASTOLIC BLOOD PRESSURE: 68 MMHG | SYSTOLIC BLOOD PRESSURE: 126 MMHG | HEART RATE: 114 BPM | WEIGHT: 236 LBS | BODY MASS INDEX: 38.85 KG/M2

## 2023-05-15 DIAGNOSIS — O99.213 MATERNAL OBESITY SYNDROME IN THIRD TRIMESTER: ICD-10-CM

## 2023-05-15 DIAGNOSIS — Z34.93 PRENATAL CARE IN THIRD TRIMESTER: Primary | ICD-10-CM

## 2023-05-15 PROCEDURE — G8427 DOCREV CUR MEDS BY ELIG CLIN: HCPCS | Performed by: OBSTETRICS & GYNECOLOGY

## 2023-05-15 PROCEDURE — 90715 TDAP VACCINE 7 YRS/> IM: CPT | Performed by: OBSTETRICS & GYNECOLOGY

## 2023-05-15 PROCEDURE — 90471 IMMUNIZATION ADMIN: CPT | Performed by: OBSTETRICS & GYNECOLOGY

## 2023-05-15 PROCEDURE — 36415 COLL VENOUS BLD VENIPUNCTURE: CPT | Performed by: OBSTETRICS & GYNECOLOGY

## 2023-05-15 PROCEDURE — 1036F TOBACCO NON-USER: CPT | Performed by: OBSTETRICS & GYNECOLOGY

## 2023-05-15 PROCEDURE — 99213 OFFICE O/P EST LOW 20 MIN: CPT | Performed by: OBSTETRICS & GYNECOLOGY

## 2023-05-15 PROCEDURE — G8419 CALC BMI OUT NRM PARAM NOF/U: HCPCS | Performed by: OBSTETRICS & GYNECOLOGY

## 2023-05-15 NOTE — PROGRESS NOTES
3:03 PM Given Tdap (Adacel) vaccine 0.5mL IM  Site:Right deltoid. Lot # [id number]  Expiration Date:  09/26/25  [facility] # [id number] . Patient tolerated well. No reaction noted after 20 minutes. VIS sheet provided.   Administered by: Haley Guy LPN

## 2023-05-15 NOTE — ASSESSMENT & PLAN NOTE
Prepregnancy BMI 40  - MFM referral for anatomy scan  - see above  - Recommend TWG 11-20lbs  - Nutritional counseling  - Early glucose screening 108, repeat at next visit  - q4wk growth US and weekly ANFS at 34 weeks for BMI > 40    - 5/15/23: 1214g (45%ile)  - baseline HELLP labs wnl, on daily baby aspirin as well

## 2023-05-15 NOTE — PROGRESS NOTES
Temp-98.4F infrared  Patient began drinking glucose 50G at 1353. Finished drink at 1355. No c/o n/v at this time. Will continue to monitor.

## 2023-05-16 LAB
BASOPHILS # BLD: 0.1 K/UL (ref 0–0.2)
BASOPHILS NFR BLD: 0.7 %
DEPRECATED RDW RBC AUTO: 15.1 % (ref 12.4–15.4)
EOSINOPHIL # BLD: 0 K/UL (ref 0–0.6)
EOSINOPHIL NFR BLD: 0.4 %
GLUCOSE 1H P 50 G GLC PO SERPL-MCNC: 177 MG/DL
HCT VFR BLD AUTO: 33.9 % (ref 36–48)
HGB BLD-MCNC: 11.2 G/DL (ref 12–16)
LYMPHOCYTES # BLD: 1.7 K/UL (ref 1–5.1)
LYMPHOCYTES NFR BLD: 16.3 %
MCH RBC QN AUTO: 29.1 PG (ref 26–34)
MCHC RBC AUTO-ENTMCNC: 33.1 G/DL (ref 31–36)
MCV RBC AUTO: 88.1 FL (ref 80–100)
MONOCYTES # BLD: 0.4 K/UL (ref 0–1.3)
MONOCYTES NFR BLD: 4 %
NEUTROPHILS # BLD: 8.1 K/UL (ref 1.7–7.7)
NEUTROPHILS NFR BLD: 78.6 %
PLATELET # BLD AUTO: 211 K/UL (ref 135–450)
PMV BLD AUTO: 10.9 FL (ref 5–10.5)
RBC # BLD AUTO: 3.85 M/UL (ref 4–5.2)
WBC # BLD AUTO: 10.3 K/UL (ref 4–11)

## 2023-05-18 ENCOUNTER — NURSE ONLY (OUTPATIENT)
Dept: OBGYN CLINIC | Age: 18
End: 2023-05-18
Payer: COMMERCIAL

## 2023-05-18 DIAGNOSIS — R73.09 ABNORMAL GLUCOSE TOLERANCE TEST (GTT): Primary | ICD-10-CM

## 2023-05-18 PROCEDURE — 36415 COLL VENOUS BLD VENIPUNCTURE: CPT | Performed by: OBSTETRICS & GYNECOLOGY

## 2023-05-18 NOTE — PROGRESS NOTES
Patient began drinking glucose 100G at [address] [facility]. Finished drink at 0842. No c/o n/v at this time. Will continue to monitor. Blood draw from [facility] x 1 attempt without difficulty. 0 SST, 1 PST, 0 LAV tubes drawn. Patient tolerated well.  Christian Guadarrama LPN

## 2023-05-19 LAB
GLUCOSE 1H P 100 G GLC PO SERPL-MCNC: 162 MG/DL
GLUCOSE 2H P 100 G GLC PO SERPL-MCNC: 127 MG/DL
GLUCOSE 3H P 100 G GLC PO SERPL-MCNC: 67 MG/DL
GLUCOSE BS SERPL-MCNC: 63 MG/DL

## 2023-05-26 ENCOUNTER — TELEPHONE (OUTPATIENT)
Dept: OBGYN | Age: 18
End: 2023-05-26

## 2023-05-26 DIAGNOSIS — R10.9 ABDOMINAL CRAMPING: Primary | ICD-10-CM

## 2023-06-06 ENCOUNTER — ROUTINE PRENATAL (OUTPATIENT)
Dept: OBGYN CLINIC | Age: 18
End: 2023-06-06
Payer: COMMERCIAL

## 2023-06-06 VITALS
WEIGHT: 242.2 LBS | DIASTOLIC BLOOD PRESSURE: 82 MMHG | SYSTOLIC BLOOD PRESSURE: 110 MMHG | HEART RATE: 112 BPM | BODY MASS INDEX: 39.87 KG/M2

## 2023-06-06 DIAGNOSIS — Z34.93 PRENATAL CARE IN THIRD TRIMESTER: Primary | ICD-10-CM

## 2023-06-06 DIAGNOSIS — O26.893 PREGNANCY HEADACHE IN THIRD TRIMESTER: ICD-10-CM

## 2023-06-06 DIAGNOSIS — R51.9 PREGNANCY HEADACHE IN THIRD TRIMESTER: ICD-10-CM

## 2023-06-06 DIAGNOSIS — O99.210 MATERNAL OBESITY SYNDROME, ANTEPARTUM: ICD-10-CM

## 2023-06-06 PROCEDURE — 99213 OFFICE O/P EST LOW 20 MIN: CPT | Performed by: OBSTETRICS & GYNECOLOGY

## 2023-06-06 PROCEDURE — G8427 DOCREV CUR MEDS BY ELIG CLIN: HCPCS | Performed by: OBSTETRICS & GYNECOLOGY

## 2023-06-06 PROCEDURE — 1036F TOBACCO NON-USER: CPT | Performed by: OBSTETRICS & GYNECOLOGY

## 2023-06-06 PROCEDURE — G8419 CALC BMI OUT NRM PARAM NOF/U: HCPCS | Performed by: OBSTETRICS & GYNECOLOGY

## 2023-06-06 NOTE — ASSESSMENT & PLAN NOTE
Prepregnancy BMI 40  - MFM referral for anatomy scan  - see above  - Recommend TWG 11-20lbs  - Nutritional counseling  - Early glucose screening 108  - q4wk growth US and weekly ANFS at 34 weeks for BMI > 40    - 5/15/23: 1214g (45%ile)  - baseline HELLP labs wnl, on daily baby aspirin as well

## 2023-06-06 NOTE — PROGRESS NOTES
Return OB Office Visit    CC:   Chief Complaint   Patient presents with    Routine Prenatal Visit       HPI:  Pt seen and examined. No concerns/complaints. Denies VB, LOF, ctx. +FM.     +HA, no chest pain, SOB. No blurry vision. Has not used any medication OTC yet. Maternal wellness questionnaire reviewed - no concerns today. Score 2. Objective:  /82   Pulse (!) 112   Wt 242 lb 3.2 oz (109.9 kg)   LMP 10/27/2022   BMI 39.87 kg/m²   Gen: AO, NAD  Abd: Soft, NT  FHT: 132  FH: 31cm, unk by [place]  Ext: Mild LE edema    Assessment/Plan:  21 y.o. Sharri Connor at 31w2d (Estimated Date of Delivery: 8/6/23) presents for JACOBY appointment:     Problem List Items Addressed This Visit          Other    Prenatal care in third trimester - Primary     - FWB: reassuring by dat today  - Genetic screening: MQS low risk  - Anatomy scan: 3/15/23: 283g, nl anatomy (suboptimal n/l, AA, DA, 3VV, hands), CL 3.17cm, nl fluid, posterior placenta/no previa    - 4/20/23: 713g (40%ile), suboptimal AA, DA, 3VV    - 5/15/23: 1214g (45%ile), nl completion anatomy  - Tdap: 5/15/23  - PNL: O+/ab-, RI, HepB/C neg, HIVnr, RPRnr, VZV immune, UDS neg, Hgb , GCCT neg, UCx no growth     - Hgb 11.2, 1hr       - 3hr 63 / 162 / 127 / 67          Maternal obesity syndrome     Prepregnancy BMI 40  - MFM referral for anatomy scan  - see above  - Recommend TWG 11-20lbs  - Nutritional counseling  - Early glucose screening 108  - q4wk growth US and weekly ANFS at 34 weeks for BMI > 40    - 5/15/23: 1214g (45%ile)  - baseline HELLP labs wnl, on daily baby aspirin as well           Other Visit Diagnoses       Pregnancy headache in third trimester        Normotensive today.  Reviewed symptomatic relief, tylenol, hydration, etc and return/preE precautions            Dispo: RTC in 1 week for f/u with Laura Fulton MD

## 2023-06-06 NOTE — ASSESSMENT & PLAN NOTE
- FWB: reassuring by dat today  - Genetic screening: MQS low risk  - Anatomy scan: 3/15/23: 283g, nl anatomy (suboptimal n/l, AA, DA, 3VV, hands), CL 3.17cm, nl fluid, posterior placenta/no previa    - 4/20/23: 713g (40%ile), suboptimal AA, DA, 3VV    - 5/15/23: 1214g (45%ile), nl completion anatomy  - Tdap: 5/15/23  - PNL: O+/ab-, RI, HepB/C neg, HIVnr, RPRnr, VZV immune, UDS neg, Hgb , GCCT neg, UCx no growth     - Hgb 11.2, 1hr       - 3hr 63 / 162 / 127 / 67

## 2023-06-12 PROBLEM — R03.0 ELEVATED BLOOD PRESSURE READING: Status: ACTIVE | Noted: 2023-06-12

## 2023-06-23 RX ORDER — PNV NO.95/FERROUS FUM/FOLIC AC 28MG-0.8MG
1 TABLET ORAL DAILY
Qty: 30 TABLET | Refills: 5 | Status: SHIPPED | OUTPATIENT
Start: 2023-06-23

## 2023-06-26 ENCOUNTER — ROUTINE PRENATAL (OUTPATIENT)
Dept: OBGYN CLINIC | Age: 18
End: 2023-06-26
Payer: COMMERCIAL

## 2023-06-26 VITALS
DIASTOLIC BLOOD PRESSURE: 88 MMHG | BODY MASS INDEX: 41.17 KG/M2 | HEART RATE: 95 BPM | WEIGHT: 247.4 LBS | SYSTOLIC BLOOD PRESSURE: 122 MMHG

## 2023-06-26 DIAGNOSIS — O99.210 OBESITY IN PREGNANCY: ICD-10-CM

## 2023-06-26 DIAGNOSIS — Z34.93 PRENATAL CARE IN THIRD TRIMESTER: Primary | ICD-10-CM

## 2023-06-26 PROCEDURE — 1036F TOBACCO NON-USER: CPT | Performed by: OBSTETRICS & GYNECOLOGY

## 2023-06-26 PROCEDURE — 99213 OFFICE O/P EST LOW 20 MIN: CPT | Performed by: OBSTETRICS & GYNECOLOGY

## 2023-06-26 PROCEDURE — G8427 DOCREV CUR MEDS BY ELIG CLIN: HCPCS | Performed by: OBSTETRICS & GYNECOLOGY

## 2023-06-26 PROCEDURE — G8419 CALC BMI OUT NRM PARAM NOF/U: HCPCS | Performed by: OBSTETRICS & GYNECOLOGY

## 2023-07-03 ENCOUNTER — ROUTINE PRENATAL (OUTPATIENT)
Dept: OBGYN CLINIC | Age: 18
End: 2023-07-03
Payer: COMMERCIAL

## 2023-07-03 VITALS
OXYGEN SATURATION: 98 % | SYSTOLIC BLOOD PRESSURE: 122 MMHG | WEIGHT: 248.6 LBS | HEIGHT: 65 IN | BODY MASS INDEX: 41.42 KG/M2 | DIASTOLIC BLOOD PRESSURE: 82 MMHG | HEART RATE: 98 BPM | TEMPERATURE: 98.1 F

## 2023-07-03 DIAGNOSIS — R10.11 RIGHT UPPER QUADRANT PAIN: ICD-10-CM

## 2023-07-03 DIAGNOSIS — R03.0 ELEVATED BLOOD PRESSURE READING: ICD-10-CM

## 2023-07-03 DIAGNOSIS — Z34.93 PRENATAL CARE IN THIRD TRIMESTER: Primary | ICD-10-CM

## 2023-07-03 DIAGNOSIS — O99.210 MATERNAL OBESITY SYNDROME, ANTEPARTUM: ICD-10-CM

## 2023-07-03 PROCEDURE — 1036F TOBACCO NON-USER: CPT | Performed by: OBSTETRICS & GYNECOLOGY

## 2023-07-03 PROCEDURE — 99213 OFFICE O/P EST LOW 20 MIN: CPT | Performed by: OBSTETRICS & GYNECOLOGY

## 2023-07-03 PROCEDURE — G8427 DOCREV CUR MEDS BY ELIG CLIN: HCPCS | Performed by: OBSTETRICS & GYNECOLOGY

## 2023-07-03 PROCEDURE — 36415 COLL VENOUS BLD VENIPUNCTURE: CPT | Performed by: OBSTETRICS & GYNECOLOGY

## 2023-07-03 PROCEDURE — G8419 CALC BMI OUT NRM PARAM NOF/U: HCPCS | Performed by: OBSTETRICS & GYNECOLOGY

## 2023-07-03 ASSESSMENT — PATIENT HEALTH QUESTIONNAIRE - PHQ9
SUM OF ALL RESPONSES TO PHQ QUESTIONS 1-9: 0
2. FEELING DOWN, DEPRESSED OR HOPELESS: 0
SUM OF ALL RESPONSES TO PHQ9 QUESTIONS 1 & 2: 0
SUM OF ALL RESPONSES TO PHQ QUESTIONS 1-9: 0
SUM OF ALL RESPONSES TO PHQ QUESTIONS 1-9: 0
1. LITTLE INTEREST OR PLEASURE IN DOING THINGS: 0
SUM OF ALL RESPONSES TO PHQ QUESTIONS 1-9: 0

## 2023-07-04 LAB
ALBUMIN SERPL-MCNC: 3.5 G/DL (ref 3.4–5)
ALBUMIN/GLOB SERPL: 1 {RATIO} (ref 1.1–2.2)
ALP SERPL-CCNC: 162 U/L (ref 40–129)
ALT SERPL-CCNC: 23 U/L (ref 10–40)
ANION GAP SERPL CALCULATED.3IONS-SCNC: 11 MMOL/L (ref 3–16)
AST SERPL-CCNC: 21 U/L (ref 15–37)
BILIRUB SERPL-MCNC: <0.2 MG/DL (ref 0–1)
BUN SERPL-MCNC: 6 MG/DL (ref 7–20)
CALCIUM SERPL-MCNC: 9.6 MG/DL (ref 8.3–10.6)
CHLORIDE SERPL-SCNC: 104 MMOL/L (ref 99–110)
CO2 SERPL-SCNC: 18 MMOL/L (ref 21–32)
CREAT SERPL-MCNC: <0.5 MG/DL (ref 0.6–1.1)
GFR SERPLBLD CREATININE-BSD FMLA CKD-EPI: >60 ML/MIN/{1.73_M2}
GLUCOSE SERPL-MCNC: 100 MG/DL (ref 70–99)
POTASSIUM SERPL-SCNC: 4.2 MMOL/L (ref 3.5–5.1)
PROT SERPL-MCNC: 6.9 G/DL (ref 6.4–8.2)
SODIUM SERPL-SCNC: 133 MMOL/L (ref 136–145)

## 2023-07-17 ENCOUNTER — ROUTINE PRENATAL (OUTPATIENT)
Dept: OBGYN CLINIC | Age: 18
End: 2023-07-17
Payer: COMMERCIAL

## 2023-07-17 VITALS
WEIGHT: 247.4 LBS | HEART RATE: 94 BPM | DIASTOLIC BLOOD PRESSURE: 72 MMHG | BODY MASS INDEX: 41.17 KG/M2 | SYSTOLIC BLOOD PRESSURE: 118 MMHG

## 2023-07-17 DIAGNOSIS — Z34.93 PRENATAL CARE IN THIRD TRIMESTER: Primary | ICD-10-CM

## 2023-07-17 DIAGNOSIS — R03.0 ELEVATED BLOOD PRESSURE READING: ICD-10-CM

## 2023-07-17 DIAGNOSIS — O99.213 MATERNAL OBESITY SYNDROME IN THIRD TRIMESTER: ICD-10-CM

## 2023-07-17 PROCEDURE — G8427 DOCREV CUR MEDS BY ELIG CLIN: HCPCS | Performed by: OBSTETRICS & GYNECOLOGY

## 2023-07-17 PROCEDURE — 99213 OFFICE O/P EST LOW 20 MIN: CPT | Performed by: OBSTETRICS & GYNECOLOGY

## 2023-07-17 PROCEDURE — G8419 CALC BMI OUT NRM PARAM NOF/U: HCPCS | Performed by: OBSTETRICS & GYNECOLOGY

## 2023-07-17 PROCEDURE — 1036F TOBACCO NON-USER: CPT | Performed by: OBSTETRICS & GYNECOLOGY

## 2023-07-17 NOTE — ASSESSMENT & PLAN NOTE
- FWB: reassuring by dat today  - Genetic screening: MQS low risk  - Anatomy scan: 3/15/23: 283g, nl anatomy (suboptimal n/l, AA, DA, 3VV, hands), CL 3.17cm, nl fluid, posterior placenta/no previa    - 4/20/23: 713g (40%ile), suboptimal AA, DA, 3VV    - 5/15/23: 1214g (45%ile), nl completion anatomy  - Tdap: 5/15/23  - PNL: O+/ab-, RI, HepB/C neg, HIVnr, RPRnr, VZV immune, UDS neg, Hgb , GCCT neg, UCx no growth     - Hgb 11.2, 1hr       - 3hr 63 / 162 / 127 / 67     - GBS sent today

## 2023-07-17 NOTE — PROGRESS NOTES
Maternal emotional well being screening form completed and reviewed with patient. Current score is 0.

## 2023-07-17 NOTE — ASSESSMENT & PLAN NOTE
Prepregnancy BMI 40  - MFM referral for anatomy scan  - see above  - Recommend TWG 11-20lbs  - Nutritional counseling  - Early glucose screening 108  - q4wk growth US and weekly ANFS at 34 weeks for BMI > 40    - 5/15/23: 1214g (45%ile)    - 6/12/23: 1975g (48%ile)    - 7/17/23: 8390M (39%ile)  - baseline HELLP labs wnl, on daily baby aspirin as well

## 2023-07-20 LAB
GP B STREP SPEC QL CULT: ABNORMAL
ORGANISM: ABNORMAL

## 2023-07-24 ENCOUNTER — ROUTINE PRENATAL (OUTPATIENT)
Dept: OBGYN CLINIC | Age: 18
End: 2023-07-24
Payer: COMMERCIAL

## 2023-07-24 VITALS
SYSTOLIC BLOOD PRESSURE: 136 MMHG | DIASTOLIC BLOOD PRESSURE: 88 MMHG | TEMPERATURE: 98 F | WEIGHT: 251.8 LBS | OXYGEN SATURATION: 99 % | BODY MASS INDEX: 41.9 KG/M2 | HEART RATE: 120 BPM

## 2023-07-24 DIAGNOSIS — Z34.93 PRENATAL CARE IN THIRD TRIMESTER: Primary | ICD-10-CM

## 2023-07-24 DIAGNOSIS — O26.893 PREGNANCY HEADACHE IN THIRD TRIMESTER: ICD-10-CM

## 2023-07-24 DIAGNOSIS — R73.09 ABNORMAL GLUCOSE TOLERANCE TEST (GTT): ICD-10-CM

## 2023-07-24 DIAGNOSIS — R51.9 PREGNANCY HEADACHE IN THIRD TRIMESTER: ICD-10-CM

## 2023-07-24 DIAGNOSIS — E66.01 OBESITY, CLASS III, BMI 40-49.9 (MORBID OBESITY) (HCC): ICD-10-CM

## 2023-07-24 DIAGNOSIS — O99.213 MATERNAL OBESITY SYNDROME IN THIRD TRIMESTER: ICD-10-CM

## 2023-07-24 PROCEDURE — G8419 CALC BMI OUT NRM PARAM NOF/U: HCPCS | Performed by: OBSTETRICS & GYNECOLOGY

## 2023-07-24 PROCEDURE — 99213 OFFICE O/P EST LOW 20 MIN: CPT | Performed by: OBSTETRICS & GYNECOLOGY

## 2023-07-24 PROCEDURE — G8427 DOCREV CUR MEDS BY ELIG CLIN: HCPCS | Performed by: OBSTETRICS & GYNECOLOGY

## 2023-07-24 PROCEDURE — 1036F TOBACCO NON-USER: CPT | Performed by: OBSTETRICS & GYNECOLOGY

## 2023-08-03 ENCOUNTER — ROUTINE PRENATAL (OUTPATIENT)
Dept: OBGYN CLINIC | Age: 18
End: 2023-08-03
Payer: COMMERCIAL

## 2023-08-03 VITALS
WEIGHT: 257.2 LBS | DIASTOLIC BLOOD PRESSURE: 78 MMHG | HEART RATE: 102 BPM | BODY MASS INDEX: 42.8 KG/M2 | SYSTOLIC BLOOD PRESSURE: 130 MMHG

## 2023-08-03 DIAGNOSIS — E66.01 OBESITY, CLASS III, BMI 40-49.9 (MORBID OBESITY) (HCC): ICD-10-CM

## 2023-08-03 DIAGNOSIS — Z34.93 PRENATAL CARE IN THIRD TRIMESTER: Primary | ICD-10-CM

## 2023-08-03 DIAGNOSIS — E66.01 MORBID OBESITY WITH BMI OF 40.0-44.9, ADULT (HCC): ICD-10-CM

## 2023-08-03 DIAGNOSIS — O09.93 HIGH-RISK PREGNANCY SUPERVISION, THIRD TRIMESTER: ICD-10-CM

## 2023-08-03 DIAGNOSIS — R73.09 ABNORMAL GLUCOSE TOLERANCE TEST (GTT): ICD-10-CM

## 2023-08-03 DIAGNOSIS — O99.213 MATERNAL OBESITY SYNDROME IN THIRD TRIMESTER: ICD-10-CM

## 2023-08-03 PROCEDURE — G8419 CALC BMI OUT NRM PARAM NOF/U: HCPCS | Performed by: OBSTETRICS & GYNECOLOGY

## 2023-08-03 PROCEDURE — 99213 OFFICE O/P EST LOW 20 MIN: CPT | Performed by: OBSTETRICS & GYNECOLOGY

## 2023-08-03 PROCEDURE — G8427 DOCREV CUR MEDS BY ELIG CLIN: HCPCS | Performed by: OBSTETRICS & GYNECOLOGY

## 2023-08-03 PROCEDURE — 1036F TOBACCO NON-USER: CPT | Performed by: OBSTETRICS & GYNECOLOGY

## 2023-08-06 ENCOUNTER — HOSPITAL ENCOUNTER (INPATIENT)
Age: 18
LOS: 3 days | Discharge: HOME | DRG: 560 | End: 2023-08-09
Attending: OBSTETRICS & GYNECOLOGY | Admitting: OBSTETRICS & GYNECOLOGY
Payer: COMMERCIAL

## 2023-08-06 ENCOUNTER — APPOINTMENT (OUTPATIENT)
Dept: LABOR AND DELIVERY | Age: 18
DRG: 560 | End: 2023-08-06
Payer: COMMERCIAL

## 2023-08-06 ENCOUNTER — ANESTHESIA (OUTPATIENT)
Dept: LABOR AND DELIVERY | Age: 18
DRG: 560 | End: 2023-08-06
Payer: COMMERCIAL

## 2023-08-06 ENCOUNTER — ANESTHESIA EVENT (OUTPATIENT)
Dept: LABOR AND DELIVERY | Age: 18
DRG: 560 | End: 2023-08-06
Payer: COMMERCIAL

## 2023-08-06 PROBLEM — Z34.90 ENCOUNTER FOR INDUCTION OF LABOR: Status: ACTIVE | Noted: 2023-08-06

## 2023-08-06 LAB
ABO + RH BLD: NORMAL
AMPHETAMINES UR QL SCN>1000 NG/ML: NORMAL
BARBITURATES UR QL SCN>200 NG/ML: NORMAL
BASOPHILS # BLD: 0.1 K/UL (ref 0–0.2)
BASOPHILS NFR BLD: 0.6 %
BENZODIAZ UR QL SCN>200 NG/ML: NORMAL
BLD GP AB SCN SERPL QL: NORMAL
BUPRENORPHINE+NOR UR QL SCN: NORMAL
CANNABINOIDS UR QL SCN>50 NG/ML: NORMAL
COCAINE UR QL SCN: NORMAL
DEPRECATED RDW RBC AUTO: 15.9 % (ref 12.4–15.4)
DRUG SCREEN COMMENT UR-IMP: NORMAL
EOSINOPHIL # BLD: 0 K/UL (ref 0–0.6)
EOSINOPHIL NFR BLD: 0.3 %
FENTANYL SCREEN, URINE: NORMAL
HCT VFR BLD AUTO: 34.9 % (ref 36–48)
HGB BLD-MCNC: 11.6 G/DL (ref 12–16)
LYMPHOCYTES # BLD: 2.7 K/UL (ref 1–5.1)
LYMPHOCYTES NFR BLD: 23.7 %
MCH RBC QN AUTO: 27.9 PG (ref 26–34)
MCHC RBC AUTO-ENTMCNC: 33.2 G/DL (ref 31–36)
MCV RBC AUTO: 84.2 FL (ref 80–100)
METHADONE UR QL SCN>300 NG/ML: NORMAL
MONOCYTES # BLD: 1.3 K/UL (ref 0–1.3)
MONOCYTES NFR BLD: 11.8 %
NEUTROPHILS # BLD: 7.2 K/UL (ref 1.7–7.7)
NEUTROPHILS NFR BLD: 63.6 %
OPIATES UR QL SCN>300 NG/ML: NORMAL
OXYCODONE UR QL SCN: NORMAL
PCP UR QL SCN>25 NG/ML: NORMAL
PH UR STRIP: 6 [PH]
PLATELET # BLD AUTO: 186 K/UL (ref 135–450)
PMV BLD AUTO: 10.9 FL (ref 5–10.5)
RBC # BLD AUTO: 4.15 M/UL (ref 4–5.2)
WBC # BLD AUTO: 11.4 K/UL (ref 4–11)

## 2023-08-06 PROCEDURE — 85025 COMPLETE CBC W/AUTO DIFF WBC: CPT

## 2023-08-06 PROCEDURE — 2500000003 HC RX 250 WO HCPCS: Performed by: OBSTETRICS & GYNECOLOGY

## 2023-08-06 PROCEDURE — 86780 TREPONEMA PALLIDUM: CPT

## 2023-08-06 PROCEDURE — 6360000002 HC RX W HCPCS: Performed by: OBSTETRICS & GYNECOLOGY

## 2023-08-06 PROCEDURE — 86901 BLOOD TYPING SEROLOGIC RH(D): CPT

## 2023-08-06 PROCEDURE — 86900 BLOOD TYPING SEROLOGIC ABO: CPT

## 2023-08-06 PROCEDURE — 2500000003 HC RX 250 WO HCPCS: Performed by: NURSE ANESTHETIST, CERTIFIED REGISTERED

## 2023-08-06 PROCEDURE — 80307 DRUG TEST PRSMV CHEM ANLYZR: CPT

## 2023-08-06 PROCEDURE — 59200 INSERT CERVICAL DILATOR: CPT | Performed by: OBSTETRICS & GYNECOLOGY

## 2023-08-06 PROCEDURE — 51701 INSERT BLADDER CATHETER: CPT

## 2023-08-06 PROCEDURE — 86850 RBC ANTIBODY SCREEN: CPT

## 2023-08-06 PROCEDURE — 1220000000 HC SEMI PRIVATE OB R&B

## 2023-08-06 PROCEDURE — 2580000003 HC RX 258: Performed by: OBSTETRICS & GYNECOLOGY

## 2023-08-06 PROCEDURE — 6360000002 HC RX W HCPCS: Performed by: NURSE ANESTHETIST, CERTIFIED REGISTERED

## 2023-08-06 RX ORDER — ONDANSETRON 2 MG/ML
4 INJECTION INTRAMUSCULAR; INTRAVENOUS EVERY 6 HOURS PRN
Status: DISCONTINUED | OUTPATIENT
Start: 2023-08-06 | End: 2023-08-07

## 2023-08-06 RX ORDER — SODIUM CHLORIDE 9 MG/ML
25 INJECTION, SOLUTION INTRAVENOUS PRN
Status: DISCONTINUED | OUTPATIENT
Start: 2023-08-06 | End: 2023-08-07

## 2023-08-06 RX ORDER — SODIUM CHLORIDE, SODIUM LACTATE, POTASSIUM CHLORIDE, CALCIUM CHLORIDE 600; 310; 30; 20 MG/100ML; MG/100ML; MG/100ML; MG/100ML
INJECTION, SOLUTION INTRAVENOUS CONTINUOUS
Status: DISCONTINUED | OUTPATIENT
Start: 2023-08-06 | End: 2023-08-07

## 2023-08-06 RX ORDER — SODIUM CHLORIDE 9 MG/ML
INJECTION, SOLUTION INTRAVENOUS CONTINUOUS
Status: DISCONTINUED | OUTPATIENT
Start: 2023-08-06 | End: 2023-08-07

## 2023-08-06 RX ORDER — FAMOTIDINE 10 MG/ML
20 INJECTION, SOLUTION INTRAVENOUS 2 TIMES DAILY
Status: DISCONTINUED | OUTPATIENT
Start: 2023-08-06 | End: 2023-08-07

## 2023-08-06 RX ORDER — BUTORPHANOL TARTRATE 1 MG/ML
1 INJECTION, SOLUTION INTRAMUSCULAR; INTRAVENOUS
Status: DISCONTINUED | OUTPATIENT
Start: 2023-08-06 | End: 2023-08-07

## 2023-08-06 RX ORDER — SODIUM CHLORIDE, SODIUM LACTATE, POTASSIUM CHLORIDE, AND CALCIUM CHLORIDE .6; .31; .03; .02 G/100ML; G/100ML; G/100ML; G/100ML
1000 INJECTION, SOLUTION INTRAVENOUS PRN
Status: DISCONTINUED | OUTPATIENT
Start: 2023-08-06 | End: 2023-08-07

## 2023-08-06 RX ORDER — BUPIVACAINE HYDROCHLORIDE 2.5 MG/ML
INJECTION, SOLUTION EPIDURAL; INFILTRATION; INTRACAUDAL PRN
Status: DISCONTINUED | OUTPATIENT
Start: 2023-08-06 | End: 2023-08-07 | Stop reason: SDUPTHER

## 2023-08-06 RX ORDER — SODIUM CHLORIDE 0.9 % (FLUSH) 0.9 %
5-40 SYRINGE (ML) INJECTION EVERY 12 HOURS SCHEDULED
Status: DISCONTINUED | OUTPATIENT
Start: 2023-08-06 | End: 2023-08-07

## 2023-08-06 RX ORDER — DOCUSATE SODIUM 100 MG/1
100 CAPSULE, LIQUID FILLED ORAL 2 TIMES DAILY
Status: DISCONTINUED | OUTPATIENT
Start: 2023-08-06 | End: 2023-08-07

## 2023-08-06 RX ORDER — SODIUM CHLORIDE 0.9 % (FLUSH) 0.9 %
5-40 SYRINGE (ML) INJECTION PRN
Status: DISCONTINUED | OUTPATIENT
Start: 2023-08-06 | End: 2023-08-07

## 2023-08-06 RX ORDER — SODIUM CHLORIDE, SODIUM LACTATE, POTASSIUM CHLORIDE, AND CALCIUM CHLORIDE .6; .31; .03; .02 G/100ML; G/100ML; G/100ML; G/100ML
500 INJECTION, SOLUTION INTRAVENOUS PRN
Status: DISCONTINUED | OUTPATIENT
Start: 2023-08-06 | End: 2023-08-07

## 2023-08-06 RX ADMIN — Medication 1 MILLI-UNITS/MIN: at 12:00

## 2023-08-06 RX ADMIN — SODIUM CHLORIDE, POTASSIUM CHLORIDE, SODIUM LACTATE AND CALCIUM CHLORIDE: 600; 310; 30; 20 INJECTION, SOLUTION INTRAVENOUS at 19:40

## 2023-08-06 RX ADMIN — BUPIVACAINE HYDROCHLORIDE 1 ML: 2.5 INJECTION, SOLUTION EPIDURAL; INFILTRATION; INTRACAUDAL; PERINEURAL at 19:21

## 2023-08-06 RX ADMIN — DEXTROSE MONOHYDRATE 5 MILLION UNITS: 5 INJECTION INTRAVENOUS at 12:04

## 2023-08-06 RX ADMIN — Medication 2.5 MILLION UNITS: at 16:02

## 2023-08-06 RX ADMIN — FAMOTIDINE 20 MG: 10 INJECTION INTRAVENOUS at 11:08

## 2023-08-06 RX ADMIN — Medication 15 ML/HR: at 19:31

## 2023-08-06 RX ADMIN — SODIUM CHLORIDE, POTASSIUM CHLORIDE, SODIUM LACTATE AND CALCIUM CHLORIDE: 600; 310; 30; 20 INJECTION, SOLUTION INTRAVENOUS at 11:38

## 2023-08-06 RX ADMIN — Medication 2.5 MILLION UNITS: at 19:58

## 2023-08-06 NOTE — PLAN OF CARE
Problem: Vaginal Birth or  Section  Goal: Fetal and maternal status remain reassuring during the birth process  Description:  Birth OB-Pregnancy care plan goal which identifies if the fetal and maternal status remain reassuring during the birth process  Outcome: Progressing     Problem: Infection - Adult  Goal: Absence of infection at discharge  Outcome: Progressing  Goal: Absence of infection during hospitalization  Outcome: Progressing  Goal: Absence of fever/infection during anticipated neutropenic period  Outcome: Progressing     Problem: Safety - Adult  Goal: Free from fall injury  Outcome: Progressing     Problem: Discharge Planning  Goal: Discharge to home or other facility with appropriate resources  Outcome: Progressing     Problem: Chronic Conditions and Co-morbidities  Goal: Patient's chronic conditions and co-morbidity symptoms are monitored and maintained or improved  Outcome: Progressing

## 2023-08-06 NOTE — H&P
normal  Attention/Concentration:  normal  Language:  normal  Lungs:  No increased work of breathing, good air exchange, clear to auscultation bilaterally, no crackles or wheezing  Heart:  normal S1 and S2  Abdomen:  soft, non-distended, and non-tender  Fetal heart rate:  Baseline Heart Rate 125, accelerations:  present  long term variability:  moderate  decelerations:  absent  Pelvis:  External Genitalia: General appearance; normal, Hair distribution; normal, Lesions absent  Cervix:    DILATION:  1-2 cm  EFFACEMENT:   70%  STATION:  -2 cm  CONSISTENCY:  soft  POSITION:  anterior  Vertex presentation--limited bedside ultrasound for presentation only    Agustin bulb placed without difficulty--balloon filled with 50 cc     Contraction frequency:  irregular    Membranes:  Intact    Pelvic Ultrasound:      General Labs:  CBC:   Lab Results   Component Value Date/Time    WBC 11.4 2023 09:30 AM    RBC 4.15 2023 09:30 AM    HGB 11.6 2023 09:30 AM    HCT 34.9 2023 09:30 AM    MCV 84.2 2023 09:30 AM    RDW 15.9 2023 09:30 AM     2023 09:30 AM       ASSESSMENT AND PLAN:    The patient is a 21 y.o.  1 parity 0 at 40 weeks 0 days gestation with [address]  2. Anemia in pregnancy  3,    Morbid obesity  4. Abnormal glucose challenge test (normal 3 hour GTT)  5. GBS positive status     Plan:   Admission  Agustin bulb/pitocin induction  Antibiotics  See orders    Maira Peres D.O.   [facility]

## 2023-08-07 LAB — REAGIN+T PALLIDUM IGG+IGM SERPL-IMP: NORMAL

## 2023-08-07 PROCEDURE — 7200000001 HC VAGINAL DELIVERY

## 2023-08-07 PROCEDURE — 3E033VJ INTRODUCTION OF OTHER HORMONE INTO PERIPHERAL VEIN, PERCUTANEOUS APPROACH: ICD-10-PCS | Performed by: OBSTETRICS & GYNECOLOGY

## 2023-08-07 PROCEDURE — 0HQ9XZZ REPAIR PERINEUM SKIN, EXTERNAL APPROACH: ICD-10-PCS | Performed by: OBSTETRICS & GYNECOLOGY

## 2023-08-07 PROCEDURE — 6370000000 HC RX 637 (ALT 250 FOR IP): Performed by: OBSTETRICS & GYNECOLOGY

## 2023-08-07 PROCEDURE — 59409 OBSTETRICAL CARE: CPT | Performed by: OBSTETRICS & GYNECOLOGY

## 2023-08-07 PROCEDURE — 6360000002 HC RX W HCPCS: Performed by: OBSTETRICS & GYNECOLOGY

## 2023-08-07 PROCEDURE — 1220000000 HC SEMI PRIVATE OB R&B

## 2023-08-07 PROCEDURE — 51701 INSERT BLADDER CATHETER: CPT

## 2023-08-07 RX ORDER — SODIUM CHLORIDE 9 MG/ML
INJECTION, SOLUTION INTRAVENOUS PRN
Status: DISCONTINUED | OUTPATIENT
Start: 2023-08-07 | End: 2023-08-09 | Stop reason: HOSPADM

## 2023-08-07 RX ORDER — SODIUM CHLORIDE, SODIUM LACTATE, POTASSIUM CHLORIDE, CALCIUM CHLORIDE 600; 310; 30; 20 MG/100ML; MG/100ML; MG/100ML; MG/100ML
INJECTION, SOLUTION INTRAVENOUS CONTINUOUS
Status: DISCONTINUED | OUTPATIENT
Start: 2023-08-07 | End: 2023-08-09 | Stop reason: HOSPADM

## 2023-08-07 RX ORDER — FERROUS SULFATE 325(65) MG
325 TABLET ORAL
Status: DISCONTINUED | OUTPATIENT
Start: 2023-08-07 | End: 2023-08-09 | Stop reason: HOSPADM

## 2023-08-07 RX ORDER — ACETAMINOPHEN 500 MG
1000 TABLET ORAL EVERY 8 HOURS PRN
Status: DISCONTINUED | OUTPATIENT
Start: 2023-08-07 | End: 2023-08-09 | Stop reason: HOSPADM

## 2023-08-07 RX ORDER — LANOLIN 100 %
OINTMENT (GRAM) TOPICAL PRN
Status: DISCONTINUED | OUTPATIENT
Start: 2023-08-07 | End: 2023-08-09 | Stop reason: HOSPADM

## 2023-08-07 RX ORDER — SODIUM CHLORIDE 0.9 % (FLUSH) 0.9 %
5-40 SYRINGE (ML) INJECTION EVERY 12 HOURS SCHEDULED
Status: DISCONTINUED | OUTPATIENT
Start: 2023-08-07 | End: 2023-08-09 | Stop reason: HOSPADM

## 2023-08-07 RX ORDER — ONDANSETRON 2 MG/ML
4 INJECTION INTRAMUSCULAR; INTRAVENOUS EVERY 6 HOURS PRN
Status: DISCONTINUED | OUTPATIENT
Start: 2023-08-07 | End: 2023-08-09 | Stop reason: HOSPADM

## 2023-08-07 RX ORDER — IBUPROFEN 600 MG/1
600 TABLET ORAL EVERY 6 HOURS PRN
Status: DISCONTINUED | OUTPATIENT
Start: 2023-08-07 | End: 2023-08-09 | Stop reason: HOSPADM

## 2023-08-07 RX ORDER — DOCUSATE SODIUM 100 MG/1
100 CAPSULE, LIQUID FILLED ORAL 2 TIMES DAILY
Status: DISCONTINUED | OUTPATIENT
Start: 2023-08-07 | End: 2023-08-09 | Stop reason: HOSPADM

## 2023-08-07 RX ORDER — SIMETHICONE 80 MG
80 TABLET,CHEWABLE ORAL EVERY 6 HOURS PRN
Status: DISCONTINUED | OUTPATIENT
Start: 2023-08-07 | End: 2023-08-09 | Stop reason: HOSPADM

## 2023-08-07 RX ORDER — FAMOTIDINE 20 MG/1
20 TABLET, FILM COATED ORAL 2 TIMES DAILY PRN
Status: DISCONTINUED | OUTPATIENT
Start: 2023-08-07 | End: 2023-08-09 | Stop reason: HOSPADM

## 2023-08-07 RX ORDER — SODIUM CHLORIDE 0.9 % (FLUSH) 0.9 %
5-40 SYRINGE (ML) INJECTION PRN
Status: DISCONTINUED | OUTPATIENT
Start: 2023-08-07 | End: 2023-08-09 | Stop reason: HOSPADM

## 2023-08-07 RX ADMIN — DOCUSATE SODIUM 100 MG: 100 CAPSULE, LIQUID FILLED ORAL at 09:13

## 2023-08-07 RX ADMIN — DOCUSATE SODIUM 100 MG: 100 CAPSULE, LIQUID FILLED ORAL at 19:54

## 2023-08-07 RX ADMIN — BENZOCAINE AND LEVOMENTHOL: 200; 5 SPRAY TOPICAL at 04:53

## 2023-08-07 RX ADMIN — ACETAMINOPHEN 1000 MG: 500 TABLET ORAL at 19:54

## 2023-08-07 RX ADMIN — WITCH HAZEL: 500 SOLUTION RECTAL; TOPICAL at 04:52

## 2023-08-07 RX ADMIN — IBUPROFEN 600 MG: 600 TABLET, FILM COATED ORAL at 04:52

## 2023-08-07 RX ADMIN — Medication 2.5 MILLION UNITS: at 00:08

## 2023-08-07 ASSESSMENT — PAIN SCALES - GENERAL
PAINLEVEL_OUTOF10: 6
PAINLEVEL_OUTOF10: 6

## 2023-08-07 ASSESSMENT — PAIN DESCRIPTION - LOCATION
LOCATION: HEAD
LOCATION: BACK

## 2023-08-07 ASSESSMENT — PAIN DESCRIPTION - DESCRIPTORS: DESCRIPTORS: DISCOMFORT;DULL;SORE

## 2023-08-07 ASSESSMENT — PAIN - FUNCTIONAL ASSESSMENT: PAIN_FUNCTIONAL_ASSESSMENT: ACTIVITIES ARE NOT PREVENTED

## 2023-08-07 NOTE — PLAN OF CARE
Problem: Infection - Adult  Goal: Absence of infection at discharge  2023 by Bal Saba RN  Outcome: Progressing  2023 by Bal Saba RN  Outcome: Progressing  Goal: Absence of infection during hospitalization  2023 by Bal Saba RN  Outcome: Progressing  2023 by Bal Saba RN  Outcome: Progressing  Goal: Absence of fever/infection during anticipated neutropenic period  2023 by Bal Saba RN  Outcome: Progressing  2023 by Bal Saba RN  Outcome: Progressing     Problem: Safety - Adult  Goal: Free from fall injury  2023 by Bal Saba RN  Outcome: Progressing  2023 by Bal Saba RN  Outcome: Progressing     Problem: Discharge Planning  Goal: Discharge to home or other facility with appropriate resources  2023 by Bal Saba RN  Outcome: Progressing  2023 by Bal Saba RN  Outcome: Progressing     Problem: Chronic Conditions and Co-morbidities  Goal: Patient's chronic conditions and co-morbidity symptoms are monitored and maintained or improved  2023 by Bal Saba RN  Outcome: Progressing  2023 by Bal Saba RN  Outcome: Progressing     Problem: Postpartum  Goal: Experiences normal postpartum course  Description:  Postpartum OB-Pregnancy care plan goal which identifies if the mother is experiencing a normal postpartum course  Outcome: Progressing  Goal: Appropriate maternal -  bonding  Description:  Postpartum OB-Pregnancy care plan goal which identifies if the mother and  are bonding appropriately  Outcome: Progressing  Goal: Establishment of infant feeding pattern  Description:  Postpartum OB-Pregnancy care plan goal which identifies if the mother is establishing a feeding pattern with their   Outcome: Progressing  Goal: Incisions, wounds, or drain sites healing without S/S of infection  Outcome:

## 2023-08-07 NOTE — LACTATION NOTE
This note was copied from a baby's chart. Lactation Progress Note      Data:   Initial lactation consult with michelle per RN request. RN states that family was inquiring about formula supplementation. RN would like LC to consult to confirm feeding goals. Feeding Method: Feeding Method Used: Breastfeeding  Urine output:  x1   Stool output:  x0 established  Percent weight change from birth:  0%    Action: LC introduced self to family as [address] for the day. Name and number placed on whiteboard in room. MOB with infant at breast at time of consult in cross cradle hold with jai noted. MOB confirms latch to be comfortable. LC discussed feeding goals with family and listened to concerns. FOB states that he would like to help mob by feeding infant since she is tired. [address] acknowledged fob response, and asked mob if she would like to provide infant bottles of formula. MOB states that she is very tired but feels if she could get some rest after infant is finished eating that she would like to continue breastfeeding. LC gave much support to family, and reviewed ways to help rest in between feedings, and ways that FOB could help support mob with breastfeeding. Reviewed feeding goals for infant in the first 24 hours of life. Reminded family that I was here to support her in what ever feeding method that they choose for their infant, and reviewed both the benefits of breastfeeding, and formula feeding infant. Reviewed what pumping and bottle feeding would look like, as well as formula and bottle feeding infant if she chooses to do so. Breastfeeding Booklet brought to bedside with contents reviewed. Encouraged mob to call [address] for f/u care with breastfeeding support as needed. RN was updated on infant feeding at breast, and mob planning on continuing to breast feed infant at this time. Response: MOB verbalizes understanding of bf education that was provided. States that she is comfortable with breastfeeding at this time.

## 2023-08-07 NOTE — PLAN OF CARE
Problem: Infection - Adult  Goal: Absence of infection at discharge  Outcome: Progressing  Goal: Absence of infection during hospitalization  Outcome: Progressing  Goal: Absence of fever/infection during anticipated neutropenic period  Outcome: Progressing     Problem: Safety - Adult  Goal: Free from fall injury  Outcome: Progressing     Problem: Discharge Planning  Goal: Discharge to home or other facility with appropriate resources  Outcome: Progressing     Problem: Chronic Conditions and Co-morbidities  Goal: Patient's chronic conditions and co-morbidity symptoms are monitored and maintained or improved  Outcome: Progressing

## 2023-08-07 NOTE — PROGRESS NOTES
[facility] Ob/[facility]  Post Partum Progress Note    Subjective:   Patient is a 21 y.o. y/o  female S/P spontaneous vaginal delivery at 40w EGA. PPD # 0. The pregnancy was complicated by: anemia in pregnancy, morbid obesity, elevated blood pressure (isolated elevation at 32 weeks), abnormal glucose challenge test (normal 3 hour GTT) and GBS positive status      Doing well today. No current complaints are noted including change in vision, fever, chills, chest pain, shortness of breath, nausea, vomiting, diarrhea or constipation. Patient reports mild headache this morning that resolved with ibuprofen. The patient denies dizziness with ambulation or calf tenderness. Voiding spontaneously. Colie Lack is described as minimal. The patient plans to breastfeed. Objective:   GENERAL APPEARANCE: Alert, well appearing, in no apparent distress  LUNGS: Resp effort normal   HEART: Reg rate   ABDOMEN POSTPARTUM: Benign non-tender, without masses or organomegaly palpable. Uterine fundus firm, NT, below umbilicus. EXTREMITIES: No redness or tenderness in the calves or thighs, no edema  SKIN: Normal coloration and turgor, no rashes, no suspicious skin lesions noted    Pertinent Labs:   H/H: 11.6/34.9 on , CBC  pending    Assessment / Plan:  1) Post partum    - Meeting post partum milestones    - Hemodynamically stable     2) O+ / RI / GBS+   - treated with IV penicillin    3) Baby Information    - Delivery Time / Date:  on 23   - APGARS: 8, 9    - Birth weight: 6 lbs 15 oz   - Feeding: breastfeeding    - Gender: female      Disposition:   Post Partum Instructions reviewed   Anticipate discharge on PPD # 2 pending clinical status    Yesica Chavez DO, PGY-1    [facility]     --------------------------------------------------------------------  ATTENDING ADDENDUM:    I saw and evaluated pt on 23. The case was discussed with resident physician.  I personally

## 2023-08-07 NOTE — PROGRESS NOTES
Pt ambulates to bathroom for first time get up with stand by assist x 2. Pt able to urinate without difficulty. Bonnie care explained to pt. Pt returns to bed without difficulty.

## 2023-08-07 NOTE — ANESTHESIA PROCEDURE NOTES
CSE Block    Patient location during procedure: OB  Start time: 8/6/2023 6:52 PM  End time: 8/6/2023 7:30 PM  Reason for block: labor epidural  Staffing  Performed: resident/CRNA   Resident/CRNA: SHELLEY Singletary CRNA  CSE  Patient position: sitting  Prep: ChloraPrep and site prepped and draped  Patient monitoring: continuous pulse ox and frequent blood pressure checks  Approach: midline  Provider prep: mask and sterile gloves  Spinal Needle  Needle type: pencil-tip   Needle gauge: 25 G  Needle length: 4.75 in  Epidural Needle  Injection technique: GABBI saline  Needle type: Tuohy   Needle gauge: 17 G  Needle length: 3.5 in  Needle insertion depth: 9 cm  Location: lumbar (1-5)  Catheter  Catheter type: side hole  Catheter size: 19 G  Catheter at skin depth: 15 cm  Test dose: negative  Assessment  Hemodynamics: stable  Additional Notes  Patient in sitting position. Sterile prep and drape applied to back. Skin localization with 5ml of lidocaine 1%. LORT with NS at L3-4 multiple redirections without success,  Patient repositioned with legs crossed in front. Additional 3ml of local injected at same level, LORT x 1, CSE with 25g pencan- return of clear free flowing csf. Epidural catheter advanced easily. Test dose given incrementally after negative aspirate from catheter. Sterile dressing applied.     Preanesthetic Checklist  Completed: patient identified, IV checked, site marked, risks and benefits discussed, surgical/procedural consents, equipment checked, pre-op evaluation, timeout performed, anesthesia consent given, oxygen available, monitors applied/VS acknowledged and blood product R/B/A discussed and consented

## 2023-08-07 NOTE — L&D DELIVERY SUMMARY NOTE
Department of Obstetrics and Gynecology  Spontaneous Vaginal Delivery Note      Pre-operative Diagnosis:  Term pregnancy, Induced labor, Single fetus, and Pregnancy complicated by:    anemia in pregnancy, morbid obesity, elevated blood pressure (isolated elevation at 32 weeks), abnormal glucose challenge test (normal 3 hour GTT) and GBS positive status      Post-operative Diagnosis:  Living  infant(s) and Female    Information for the patient's :  Neno Fitting Girl Antonetta Kussmaul [[id number]]                  Infant Wt:   Information for the patient's :  Neno Fitting Girl Antonetta Kussmaul [[id number]]         APGARS:     Information for the patient's :  Neno Fitting Girl Antonetta Kussmaul [[id number]]         Anesthesia:  epidural anesthesia    Application and Delivery:    20 y/o  female at 40 weeks 0 days gestation presented to L&D for induction of labor. Agustin bulb was placed AM on 23. Pitocin and antibiotics were administered. Patient progressed to 3-4 cm. Amniotomy was performed. Epidural was administered. Patient continued to progress and reached complete dilatation and effacement. She then pushed effective for < 2 hours and delivered a viable female  over an intact perineum from an [facility] presentation. No nuchal cord was noted. Shoulders and body delivered immediately after the head and  was placed on maternal abdomen for suctioning and drying. After appropriate delay, cord was clamped and cut. A segment of cord was collected but later discarded due to fetal wellbeing. Apgars 8 at 1 minute and 9 at 5 minutes. Cord blood was collected. Placenta delivered complete intact with 3 vessel cord. The lower uterine segment was cleared of all clots and products of conception. The uterus was found to be firm and hemostasis assured. No cervical, vaginal or labial lacerations were noted.   A first degree perineal laceration was noted and repaired in the usual fashion with 3-0

## 2023-08-08 LAB
BASOPHILS # BLD: 0 K/UL (ref 0–0.2)
BASOPHILS NFR BLD: 0.3 %
DEPRECATED RDW RBC AUTO: 15.7 % (ref 12.4–15.4)
EOSINOPHIL # BLD: 0.1 K/UL (ref 0–0.6)
EOSINOPHIL NFR BLD: 0.9 %
HCT VFR BLD AUTO: 28.3 % (ref 36–48)
HGB BLD-MCNC: 9.2 G/DL (ref 12–16)
LYMPHOCYTES # BLD: 3.2 K/UL (ref 1–5.1)
LYMPHOCYTES NFR BLD: 27 %
MCH RBC QN AUTO: 27.8 PG (ref 26–34)
MCHC RBC AUTO-ENTMCNC: 32.5 G/DL (ref 31–36)
MCV RBC AUTO: 85.5 FL (ref 80–100)
MONOCYTES # BLD: 1.2 K/UL (ref 0–1.3)
MONOCYTES NFR BLD: 9.7 %
NEUTROPHILS # BLD: 7.4 K/UL (ref 1.7–7.7)
NEUTROPHILS NFR BLD: 62.1 %
PLATELET # BLD AUTO: 143 K/UL (ref 135–450)
PMV BLD AUTO: 10.5 FL (ref 5–10.5)
RBC # BLD AUTO: 3.31 M/UL (ref 4–5.2)
WBC # BLD AUTO: 11.9 K/UL (ref 4–11)

## 2023-08-08 PROCEDURE — 6370000000 HC RX 637 (ALT 250 FOR IP): Performed by: OBSTETRICS & GYNECOLOGY

## 2023-08-08 PROCEDURE — 85025 COMPLETE CBC W/AUTO DIFF WBC: CPT

## 2023-08-08 PROCEDURE — 1220000000 HC SEMI PRIVATE OB R&B

## 2023-08-08 PROCEDURE — 36415 COLL VENOUS BLD VENIPUNCTURE: CPT

## 2023-08-08 RX ADMIN — IBUPROFEN 600 MG: 600 TABLET, FILM COATED ORAL at 14:12

## 2023-08-08 RX ADMIN — ACETAMINOPHEN 1000 MG: 500 TABLET ORAL at 08:46

## 2023-08-08 RX ADMIN — IBUPROFEN 600 MG: 600 TABLET, FILM COATED ORAL at 01:50

## 2023-08-08 RX ADMIN — DOCUSATE SODIUM 100 MG: 100 CAPSULE, LIQUID FILLED ORAL at 08:45

## 2023-08-08 RX ADMIN — DOCUSATE SODIUM 100 MG: 100 CAPSULE, LIQUID FILLED ORAL at 21:02

## 2023-08-08 RX ADMIN — FERROUS SULFATE TAB 325 MG (65 MG ELEMENTAL FE) 325 MG: 325 (65 FE) TAB at 08:46

## 2023-08-08 ASSESSMENT — PAIN DESCRIPTION - LOCATION
LOCATION: ABDOMEN
LOCATION: ABDOMEN
LOCATION: BACK

## 2023-08-08 ASSESSMENT — PAIN DESCRIPTION - DESCRIPTORS
DESCRIPTORS: ACHING
DESCRIPTORS: ACHING
DESCRIPTORS: DISCOMFORT;DULL;SORE

## 2023-08-08 ASSESSMENT — PAIN - FUNCTIONAL ASSESSMENT: PAIN_FUNCTIONAL_ASSESSMENT: ACTIVITIES ARE NOT PREVENTED

## 2023-08-08 ASSESSMENT — PAIN SCALES - GENERAL
PAINLEVEL_OUTOF10: 3
PAINLEVEL_OUTOF10: 3
PAINLEVEL_OUTOF10: 4

## 2023-08-08 NOTE — PLAN OF CARE
Problem: Pain  Goal: Verbalizes/displays adequate comfort level or baseline comfort level  Outcome: Progressing     Problem: Infection - Adult  Goal: Absence of infection at discharge  Outcome: Progressing  Goal: Absence of infection during hospitalization  Outcome: Progressing  Goal: Absence of fever/infection during anticipated neutropenic period  Outcome: Progressing     Problem: Safety - Adult  Goal: Free from fall injury  Outcome: Progressing

## 2023-08-08 NOTE — PROGRESS NOTES
[facility] Ob/Gyn  Post Partum Progress Note    Subjective:   Patient is a 21 y.o. y/o  female S/P spontaneous vaginal delivery at 40w EGA. PPD # 1. The pregnancy was complicated by: anemia in pregnancy, morbid obesity, elevated blood pressure (isolated elevation at 32 weeks), abnormal glucose challenge test (normal 3 hour GTT) and GBS positive status      Doing well today. No current complaints are noted including change in vision, fever, chills, chest pain, shortness of breath, nausea, vomiting, diarrhea or constipation. Patient reports mild headache this morning that resolved with ibuprofen. The patient denies dizziness with ambulation or calf tenderness. Voiding spontaneously. Lochia is described as minimal. The patient has been breastfeeding without concern. Objective:   GENERAL APPEARANCE: Alert, well appearing, in no apparent distress  LUNGS: Resp effort normal   HEART: Reg rate   ABDOMEN POSTPARTUM: Benign non-tender, without masses or organomegaly palpable. Uterine fundus firm, NT, below umbilicus.    EXTREMITIES: No redness or tenderness in the calves or thighs, no edema  SKIN: Normal coloration and turgor, no rashes, no suspicious skin lesions noted    Pertinent Labs:   H/H: 9.2/28.3  down from 11.6/34.9    Assessment / Plan:  1) Post partum    - Meeting post partum milestones    - Hemodynamically stable     2) O+ / RI / GBS+   - Treated with IV penicillin    3) Baby Information    - Delivery Time / Date: 302 on 23   - APGARS: 8, 9    - Birth weight: 6 lbs 15 oz  - Feeding: breastfeeding, plan to have lactation come by for breast pump    - Gender: female    4) Leukocytosis   - Likely inflammatory or stress, not infectious   - WBC 11.9 on     Disposition:   Post Partum Instructions reviewed   Anticipate discharge on PPD # 2 pending clinical status    Chris Munson DO, PGY-1  [facility] Medicine [facility]     ATTENDING ADDENDUM:     I saw and evaluated

## 2023-08-08 NOTE — LACTATION NOTE
This note was copied from a baby's chart. Lactation Progress Note      Data:     RN states that patient told MD that she would like to begin pumping. Baby has been breast feeding but a small formula supplement was offered this am per parents choice. Mob states that she wants to breast and bottle feed pumped milk so that fob can feed as well. Baby at breast with poor position when [facility] entered room. Action: LC assisted with improved position and deeper latch for increased comfort and milk transfer. LC asking about feeding plan. Explained that colostrum does not pump well and encouraged to continue to direct breast feed for the first 2 weeks and then she can pump 1-2 times per day and fob can then offer bottle of breast milk. LC assured that she would support what ever feeding plan she chooses. Set up with [employer] pump and education provided. Encouraged to breast feed ad tj and only pump if baby does not feed well or if she offers formula again. Reassure that baby is getting every thing she needs from the breast. Encouraged parents to call [facility] for f/u support prn. Response: Verbalized and demonstrated understanding. Remains unsure of feeding plan.

## 2023-08-08 NOTE — ANESTHESIA POSTPROCEDURE EVALUATION
Department of Anesthesiology  Postprocedure Note    Patient: Malka Salinas  MRN: [id number]  YOB: 2005  Date of evaluation: 8/8/2023      Procedure Summary     Date: 08/06/23 Room / Location:     Anesthesia Start: 1852 Anesthesia Stop: 08/07/23 0302    Procedure: Labor Analgesia Diagnosis:     Scheduled Providers:  Responsible Provider: Jose M Medina MD    Anesthesia Type: CSE ASA Status: 2          Anesthesia Type: No value filed.     Lizzette Phase I: Lizzette Score: 9    Lizzette Phase II: Lizzette Score: 10      Anesthesia Post Evaluation    Patient location during evaluation: bedside  Patient participation: complete - patient participated  Level of consciousness: awake and alert  Airway patency: patent  Nausea & Vomiting: no nausea and no vomiting  Complications: no  Cardiovascular status: hemodynamically stable  Respiratory status: acceptable  Hydration status: stable  Pain management: adequate

## 2023-08-08 NOTE — PLAN OF CARE
Problem: Pain  Goal: Verbalizes/displays adequate comfort level or baseline comfort level  Outcome: Progressing     Problem: Infection - Adult  Goal: Absence of infection at discharge  Outcome: Progressing  Goal: Absence of infection during hospitalization  Outcome: Progressing  Goal: Absence of fever/infection during anticipated neutropenic period  Outcome: Progressing     Problem: Safety - Adult  Goal: Free from fall injury  Outcome: Progressing     Problem: Discharge Planning  Goal: Discharge to home or other facility with appropriate resources  Outcome: Progressing     Problem: Chronic Conditions and Co-morbidities  Goal: Patient's chronic conditions and co-morbidity symptoms are monitored and maintained or improved  Outcome: Progressing     Problem: Postpartum  Goal: Experiences normal postpartum course  Description:  Postpartum OB-Pregnancy care plan goal which identifies if the mother is experiencing a normal postpartum course  Outcome: Progressing  Goal: Appropriate maternal -  bonding  Description:  Postpartum OB-Pregnancy care plan goal which identifies if the mother and  are bonding appropriately  Outcome: Progressing  Goal: Establishment of infant feeding pattern  Description:  Postpartum OB-Pregnancy care plan goal which identifies if the mother is establishing a feeding pattern with their   Outcome: Progressing  Goal: Incisions, wounds, or drain sites healing without S/S of infection  Outcome: Progressing

## 2023-08-09 VITALS
TEMPERATURE: 98.8 F | DIASTOLIC BLOOD PRESSURE: 76 MMHG | OXYGEN SATURATION: 96 % | RESPIRATION RATE: 16 BRPM | HEIGHT: 64 IN | WEIGHT: 257 LBS | HEART RATE: 82 BPM | BODY MASS INDEX: 43.87 KG/M2 | SYSTOLIC BLOOD PRESSURE: 123 MMHG

## 2023-08-09 PROCEDURE — 99024 POSTOP FOLLOW-UP VISIT: CPT | Performed by: OBSTETRICS & GYNECOLOGY

## 2023-08-09 RX ORDER — PSEUDOEPHEDRINE HCL 30 MG
100 TABLET ORAL 2 TIMES DAILY PRN
Qty: 60 CAPSULE | Refills: 0 | Status: SHIPPED | OUTPATIENT
Start: 2023-08-09

## 2023-08-09 RX ORDER — IBUPROFEN 600 MG/1
600 TABLET ORAL EVERY 6 HOURS PRN
Qty: 60 TABLET | Refills: 0 | Status: SHIPPED | OUTPATIENT
Start: 2023-08-09

## 2023-08-09 RX ORDER — FERROUS SULFATE 325(65) MG
325 TABLET ORAL
Qty: 30 TABLET | Refills: 3 | Status: SHIPPED | OUTPATIENT
Start: 2023-08-09

## 2023-08-09 NOTE — PROGRESS NOTES
.Discharge Phone Call    Patient Name: Syed Gerard     [facility] Care Provider: Benedict Simmonds, DO Discharge Date: 2023    Disposition of baby:    Phone Number: [phone] (home)     Attempts to Contact:  Date:    Caller  Date:    Caller  Date:    Caller    Information for the patient's :  Saima Hendersno [[id number]]   Delivery Method: Vaginal, Spontaneous     1. Now that you are at home is your pain being well controlled? Y/N   If no, instruct to call       provider. 2. Are you breastfeeding? Y/N    Do you need any extra support from our lactation staff? Y/N    If yes, provide number for lactation. 3. Have you made or already had your first appointment with the baby's doctor? Y/N   If no, do      you know when to schedule it? Y/N    4. Have you scheduled your follow-up appointment? Y/N  If no, do you know when to schedule       it? Y/N   If no, they can find it on printed discharge instructions. 5. Did staff discuss safe sleep during your stay? Y/N   6. Did we explain things in a way you could understand? Y/N  7. Were we respectful of your preferences for labor and birth and include you in the plan of       care? Y/N  If no, please explain _______________________________________________  8. Is there anyone in particular you would like to mention who provided care for you? _______      _________________________________________________________________________     9. Were you given a Post-Birth Warning Signs handout? Y/N  Do you have it somewhere      easily accessible? Y/N  If no, please send them a copy and ask them to put it somewhere      easily found. 10. Have you been crying excessively, having anger or mood swings that feel out of control, or       feel like you can't cope with caring for yourself or baby? Y/N   If yes, they may be showing       signs of postpartum depression and should call provider.  There is also a        depression test on page

## 2023-08-09 NOTE — LACTATION NOTE
This note was copied from a baby's chart. Lactation Progress Note      Data:    Follow up consult for primrebeca on day 1 pp with an infant born at 40.1 weeks gestation. MOB has been unsure about feeding plan, deciding whether to just direct BF or to pump & bottle feed. At time of consult MOB was tearful, states it hurts. FOB states he thinks breastfeeding is taking too much out of mom. Infant has begun cluster feeding. Action:    Introduced self & ensured name & lactation # is on whiteboard in room. Provided much support and encouragement. Educated mother that breast feeding should not hurt and asked if she would like me to show her how to get her baby latched without pain. MOB agreeable. Reviewed how to position infant correctly in football position, how to support the breast & infant head for a BLANE, and steps for a BLANE. Got baby into position & infant was able to get a deep latch. MOB states nipple is a little sore from earlier feeds but this time it did not hurt. After a few minutes latch became shallow. Educated & demonstrated for mother how to break suction & get baby latched more deeply. Nipple creased upon release. Checked infants mouth, it appears as if infant may have a posterior tie. Suggested parents discuss this with pediatrician. Baby re-latched & fed for 10 minutes, came off the breast for about 8 minutes then re-latched. Infant remained at the breast feeding well after consult ended. Reviewed breast feeding education & offered much support. Educated mother and father about cluster feeding and assured parents it is a normal and temporary  behavior that helps to bring in mothers mature milk. Reviewed infant feeding cues and encouraged mother to allow infant to breast feed on demand anytime feeding cues are shown and if no feeding cues are shown to attempt to wake infant to feed every 2-3 hours with a minimum of 8-12 feeds a day per 24 hour period.  Educated mother how the breasts

## 2023-08-09 NOTE — DISCHARGE INSTRUCTIONS
.Thank you for the opportunity to care for you and your family. We hope that you are happy with the care we provided during your stay in the [facility]. We want to ensure that you have the help you need when you leave the hospital. If there is anything we can assist you with, please let us know. Breastfeeding mothers may contact our lactation specialists with any problems or questions. [facility] lactation services phone number is [phone]. Leave a message and your call will be returned. Please refer to the information provided in the postpartum care booklet. The following are warning signs to remember. Call 911 if you have:    Chest pain or pressure  Shortness of breath, even at rest  Thoughts of harming yourself or others  Seizures    Call your healthcare provider if you have:    Temperature of 100.4 degrees or higher  Stitches that are not healing        -- Swelling, bleeding, drainage, foul odor, redness or warmth in/around your           stitches, staples, or incision (scar)        -- Bad smelling blood or discharge from the vagina  Vaginal bleeding that has increased         -- Soaking through one pad in an hour        -- You are passing clots larger than the size of a lemon  Red, warm tender area(s) in your breast or calf  Headache that does not get better, even after taking medicine; or headache with vision changes    Remember to notify all healthcare providers from your date of delivery to up to one year after giving birth! CARING FOR YOURSELF        DIET/ACTIVITY    Eat a well balanced diet focusing on foods high in fiber and protein. Drink plenty of fluids, especially water. To avoid constipation you may take a mild stool softener as recommended by your doctor or midwife. Gradually increase your activity. Resume an exercise regime only after being advised by your doctor or midwife. When sitting or lying down, keep your legs elevated to reduce swelling.   Avoid

## 2023-08-09 NOTE — LACTATION NOTE
This note was copied from a baby's chart. Lactation Progress Note      Data:     F/U on primip breast feeder who will be d/c home today. Mob states that baby struggled to latch during cluster feeding and the night was rough. Baby at breast currently with BLANE. Mob states that the latch is comfortable. Output and weight loss are WNL. Action: LC offered few positioning tips for increased comfort and milk transfer. Reinforced that cluster feeding is difficult but normal and temporary. Encouraged to continue to offer breast ad tj. Reviewed well fed baby check list. Discharge teaching done; what to expect in the first few days of life, to feed baby at first sign of hunger cue for total of 8-12 times per day after the first DOL, how to properly position and latch baby, how to know baby is getting enough, engorgement prevention and treatment, avoiding bottles and pacifiers. Community resources reviewed and encouraged to call [facility] or [facility] for f/u prn. Response: Parents verbalized understanding and comfortable with breast feeding for d/c.

## 2023-08-09 NOTE — DISCHARGE SUMMARY
[facility] Ob/Gyn  Obstetric Discharge Summary        Admitting Diagnosis:   Sriram Carmona is a 21 y.o. Olga Oddi at 40w0d here for IOL  O pos / RI / GBS pos  Anemia during pregnancy  Maternal obesity  Elevated BP at 32 weeks     Discharge Diagnosis:  Sriram Carmona is a 21 y.o.  s/p  at 40w1d   O pos / RI / GBS pos  Anemia during pregnancy  Maternal obesity  Elevated BP at 32 weeks     Procedures:   1. , repair of 1st degree perineal laceration    Referrals:    - Lactation Consultant      Information:      - Delivery date/time: 2023 at [address]     - Gender: Female     - Weight 3169g     - Apgars 8 & 9      - Feeding: Breast and formula    Discharge Instructions:  Please call for increased pain not controlled by pain medications, significant vaginal bleeding greater than 1 pad/hour, temperature greater than 101 degrees F or any other concerns. Plan to follow up in 2 weeks. Diet:common adult    Activity:  Increase activity gradually. No heavy lifting or driving for 2 weeks. Pelvic rest for 6 weeks. No intercourse, tampons, douching, baths.        Medications:   - Motrin    - Iron / Colace     Disposition: Home    Condition on discharge: Stable    Follow up: in 2 week(s)    Stacey Farrell DO

## 2023-09-08 ENCOUNTER — POSTPARTUM VISIT (OUTPATIENT)
Dept: OBGYN CLINIC | Age: 18
End: 2023-09-08

## 2023-09-08 VITALS
HEART RATE: 85 BPM | SYSTOLIC BLOOD PRESSURE: 114 MMHG | DIASTOLIC BLOOD PRESSURE: 62 MMHG | HEIGHT: 64 IN | BODY MASS INDEX: 44.11 KG/M2 | TEMPERATURE: 98 F

## 2023-09-08 DIAGNOSIS — R73.09 ABNORMAL GLUCOSE TOLERANCE TEST (GTT): ICD-10-CM

## 2023-09-08 DIAGNOSIS — O99.213 MATERNAL OBESITY SYNDROME IN THIRD TRIMESTER: ICD-10-CM

## 2023-09-08 PROBLEM — O99.210 MATERNAL OBESITY SYNDROME: Status: RESOLVED | Noted: 2023-02-02 | Resolved: 2023-09-08

## 2023-09-08 PROBLEM — Z34.93 PRENATAL CARE IN THIRD TRIMESTER: Status: RESOLVED | Noted: 2023-02-02 | Resolved: 2023-09-08

## 2023-10-02 ENCOUNTER — HOSPITAL ENCOUNTER (EMERGENCY)
Age: 18
Discharge: HOME | End: 2023-10-02
Payer: COMMERCIAL

## 2023-10-02 VITALS
RESPIRATION RATE: 18 BRPM | SYSTOLIC BLOOD PRESSURE: 132 MMHG | OXYGEN SATURATION: 99 % | BODY MASS INDEX: 39.75 KG/M2 | HEART RATE: 79 BPM | HEIGHT: 64 IN | WEIGHT: 232.81 LBS | DIASTOLIC BLOOD PRESSURE: 96 MMHG | TEMPERATURE: 99.1 F

## 2023-10-02 DIAGNOSIS — Z32.02 URINE PREGNANCY TEST NEGATIVE: ICD-10-CM

## 2023-10-02 DIAGNOSIS — R10.11 ABDOMINAL PAIN, RIGHT UPPER QUADRANT: Primary | ICD-10-CM

## 2023-10-02 LAB
ALBUMIN SERPL-MCNC: 4.1 G/DL (ref 3.4–5)
ALP SERPL-CCNC: 107 U/L (ref 40–129)
ALT SERPL-CCNC: <5 U/L (ref 10–40)
ANION GAP SERPL CALCULATED.3IONS-SCNC: 11 MMOL/L (ref 3–16)
AST SERPL-CCNC: 13 U/L (ref 15–37)
BASOPHILS # BLD: 0 K/UL (ref 0–0.2)
BASOPHILS NFR BLD: 0.2 %
BILIRUB DIRECT SERPL-MCNC: <0.2 MG/DL (ref 0–0.3)
BILIRUB INDIRECT SERPL-MCNC: ABNORMAL MG/DL (ref 0–1)
BILIRUB SERPL-MCNC: <0.2 MG/DL (ref 0–1)
BILIRUB UR QL STRIP.AUTO: NEGATIVE
BUN SERPL-MCNC: 14 MG/DL (ref 7–20)
CALCIUM SERPL-MCNC: 9.6 MG/DL (ref 8.3–10.6)
CHLORIDE SERPL-SCNC: 104 MMOL/L (ref 99–110)
CLARITY UR: CLEAR
CO2 SERPL-SCNC: 26 MMOL/L (ref 21–32)
COLOR UR: YELLOW
CREAT SERPL-MCNC: 0.6 MG/DL (ref 0.6–1.1)
DEPRECATED RDW RBC AUTO: 15.4 % (ref 12.4–15.4)
EOSINOPHIL # BLD: 0.1 K/UL (ref 0–0.6)
EOSINOPHIL NFR BLD: 1.5 %
GFR SERPLBLD CREATININE-BSD FMLA CKD-EPI: >60 ML/MIN/{1.73_M2}
GLUCOSE SERPL-MCNC: 100 MG/DL (ref 70–99)
GLUCOSE UR STRIP.AUTO-MCNC: NEGATIVE MG/DL
HCG UR QL: NEGATIVE
HCT VFR BLD AUTO: 38.1 % (ref 36–48)
HGB BLD-MCNC: 12.9 G/DL (ref 12–16)
HGB UR QL STRIP.AUTO: NEGATIVE
KETONES UR STRIP.AUTO-MCNC: NEGATIVE MG/DL
LEUKOCYTE ESTERASE UR QL STRIP.AUTO: NEGATIVE
LIPASE SERPL-CCNC: 31 U/L (ref 13–60)
LYMPHOCYTES # BLD: 2.2 K/UL (ref 1–5.1)
LYMPHOCYTES NFR BLD: 30.7 %
MCH RBC QN AUTO: 28 PG (ref 26–34)
MCHC RBC AUTO-ENTMCNC: 33.9 G/DL (ref 31–36)
MCV RBC AUTO: 82.7 FL (ref 80–100)
MONOCYTES # BLD: 0.6 K/UL (ref 0–1.3)
MONOCYTES NFR BLD: 8.3 %
NEUTROPHILS # BLD: 4.3 K/UL (ref 1.7–7.7)
NEUTROPHILS NFR BLD: 59.3 %
NITRITE UR QL STRIP.AUTO: NEGATIVE
PH UR STRIP.AUTO: 6 [PH] (ref 5–8)
PLATELET # BLD AUTO: 227 K/UL (ref 135–450)
PMV BLD AUTO: 8.8 FL (ref 5–10.5)
POTASSIUM SERPL-SCNC: 4.2 MMOL/L (ref 3.5–5.1)
PROT SERPL-MCNC: 7 G/DL (ref 6.4–8.2)
PROT UR STRIP.AUTO-MCNC: NEGATIVE MG/DL
RBC # BLD AUTO: 4.61 M/UL (ref 4–5.2)
SODIUM SERPL-SCNC: 141 MMOL/L (ref 136–145)
SP GR UR STRIP.AUTO: 1.02 (ref 1–1.03)
UA COMPLETE W REFLEX CULTURE PNL UR: NORMAL
UA DIPSTICK W REFLEX MICRO PNL UR: NORMAL
URN SPEC COLLECT METH UR: NORMAL
UROBILINOGEN UR STRIP-ACNC: 0.2 E.U./DL
WBC # BLD AUTO: 7.2 K/UL (ref 4–11)

## 2023-10-02 PROCEDURE — 36415 COLL VENOUS BLD VENIPUNCTURE: CPT

## 2023-10-02 PROCEDURE — 81003 URINALYSIS AUTO W/O SCOPE: CPT

## 2023-10-02 PROCEDURE — 99283 EMERGENCY DEPT VISIT LOW MDM: CPT

## 2023-10-02 PROCEDURE — 80076 HEPATIC FUNCTION PANEL: CPT

## 2023-10-02 PROCEDURE — 80048 BASIC METABOLIC PNL TOTAL CA: CPT

## 2023-10-02 PROCEDURE — 84703 CHORIONIC GONADOTROPIN ASSAY: CPT

## 2023-10-02 PROCEDURE — 83690 ASSAY OF LIPASE: CPT

## 2023-10-02 PROCEDURE — 85025 COMPLETE CBC W/AUTO DIFF WBC: CPT

## 2023-10-02 RX ORDER — DICYCLOMINE HYDROCHLORIDE 10 MG/1
10 CAPSULE ORAL 4 TIMES DAILY
Qty: 20 CAPSULE | Refills: 0 | Status: SHIPPED | OUTPATIENT
Start: 2023-10-02 | End: 2023-10-07

## 2023-10-02 NOTE — ED PROVIDER NOTES
[address] [facility],[address] ENCOUNTER        Pt Name: Bonnie Blevins  MRN: [id number]  [address] [facility] 2005  Date of evaluation: 10/2/2023  Provider: SHELLEY Bailon CNP  PCP: No primary care provider on file. Note Started: 7:36 PM EDT 10/2/23      DAO. I have evaluated this patient. CHIEF COMPLAINT       Chief Complaint   Patient presents with    Abdominal Pain     RUQ pain x 5 weeks intermittently. Pt states when the pain comes on its stabbing and lasts about 20 mins. HISTORY OF PRESENT ILLNESS: 1 or more Elements     History from : Patient    Limitations to history : None    Bonnie Blevins is a 21 y.o. toxic, well-appearing female who presents to the emergency department for evaluation pain of bilateral greater and RUQ abdomen described as \"sharp and stabbing\" rated severity of 10/10 present for the past 5 weeks and more intense over the past 4 days. Denies chest pain, nausea, vomiting, lightheadedness, dizziness, presyncope, fever, chills, sweats, cough, headache, body aches, diarrhea, urinary symptoms/retention, or other symptoms/concerns. Last BM was yesterday and was a normal, formed, brown stool. LMP 9/25/2023. Patient denies active pain with the last abdominal discomfort occurring at 1600 hrs. Patient endorses no abdominal surgeries in the past.    Nursing Notes were all reviewed and agreed with or any disagreements were addressed in the HPI. REVIEW OF SYSTEMS :      Review of Systems   Constitutional:  Negative for chills, diaphoresis, fatigue and fever. HENT:  Negative for congestion and sore throat. Eyes:  Negative for pain and visual disturbance. Respiratory:  Negative for cough and shortness of breath. Cardiovascular:  Negative for chest pain and leg swelling. Gastrointestinal:  Positive for abdominal pain. Negative for anal bleeding, blood in stool, constipation, diarrhea, nausea and vomiting.    Genitourinary:

## 2023-10-02 NOTE — DISCHARGE INSTRUCTIONS
Return to the emergency department for new or worsening symptoms including, but not limited to, developing worsening pain unrelieved by medications, fever, chills, sweats, diarrhea, inability to tolerate food or drink, or other symptoms/concerns. Medication as prescribed. Follow-up with the physician referral service line by calling the provided number in order to establish a primary care provider. Follow-up in outpatient basis for a RUQ gallbladder ultrasound. Call to schedule the appointment, go to [facility] lobby at the appointed time and bring the order form for the test with you.

## 2023-10-05 ENCOUNTER — HOSPITAL ENCOUNTER (OUTPATIENT)
Dept: ULTRASOUND IMAGING | Age: 18
Discharge: HOME | End: 2023-10-05
Payer: COMMERCIAL

## 2023-10-05 DIAGNOSIS — Z32.02 URINE PREGNANCY TEST NEGATIVE: ICD-10-CM

## 2023-10-05 DIAGNOSIS — R10.11 ABDOMINAL PAIN, RIGHT UPPER QUADRANT: ICD-10-CM

## 2023-10-05 PROCEDURE — 76705 ECHO EXAM OF ABDOMEN: CPT

## 2023-10-08 ASSESSMENT — ENCOUNTER SYMPTOMS
BLOOD IN STOOL: 0
BACK PAIN: 0
VOMITING: 0
COUGH: 0
EYE PAIN: 0
NAUSEA: 0
ANAL BLEEDING: 0
CONSTIPATION: 0
ABDOMINAL PAIN: 1
SHORTNESS OF BREATH: 0
DIARRHEA: 0
SORE THROAT: 0

## 2024-12-04 ENCOUNTER — HOSPITAL ENCOUNTER (EMERGENCY)
Age: 19
Discharge: HOME | End: 2024-12-04
Payer: COMMERCIAL

## 2024-12-04 VITALS
HEIGHT: 65 IN | BODY MASS INDEX: 43.78 KG/M2 | OXYGEN SATURATION: 98 % | DIASTOLIC BLOOD PRESSURE: 74 MMHG | WEIGHT: 262.79 LBS | HEART RATE: 82 BPM | RESPIRATION RATE: 18 BRPM | SYSTOLIC BLOOD PRESSURE: 114 MMHG | TEMPERATURE: 98.4 F

## 2024-12-04 DIAGNOSIS — N30.00 ACUTE CYSTITIS WITHOUT HEMATURIA: Primary | ICD-10-CM

## 2024-12-04 LAB
BACTERIA GENITAL QL WET PREP: NORMAL
BACTERIA URNS QL MICRO: ABNORMAL /HPF
BILIRUB UR QL STRIP.AUTO: NEGATIVE
CLARITY UR: ABNORMAL
CLUE CELLS SPEC QL WET PREP: NORMAL
COLOR UR: YELLOW
EPI CELLS #/AREA URNS HPF: ABNORMAL /HPF (ref 0–5)
EPI CELLS SPEC QL WET PREP: NORMAL
GLUCOSE UR STRIP.AUTO-MCNC: NEGATIVE MG/DL
HCG UR QL: NEGATIVE
HGB UR QL STRIP.AUTO: NEGATIVE
KETONES UR STRIP.AUTO-MCNC: NEGATIVE MG/DL
LEUKOCYTE ESTERASE UR QL STRIP.AUTO: ABNORMAL
NITRITE UR QL STRIP.AUTO: NEGATIVE
PH UR STRIP.AUTO: 6 [PH] (ref 5–8)
PROT UR STRIP.AUTO-MCNC: 30 MG/DL
RBC #/AREA URNS HPF: ABNORMAL /HPF (ref 0–4)
RBC SPEC QL WET PREP: NORMAL
SP GR UR STRIP.AUTO: >=1.03 (ref 1–1.03)
SPECIMEN SOURCE FLD: NORMAL
T VAGINALIS GENITAL QL WET PREP: NORMAL
UA COMPLETE W REFLEX CULTURE PNL UR: YES
UA DIPSTICK W REFLEX MICRO PNL UR: YES
URN SPEC COLLECT METH UR: ABNORMAL
UROBILINOGEN UR STRIP-ACNC: 0.2 E.U./DL
WBC #/AREA URNS HPF: ABNORMAL /HPF (ref 0–5)
WBC SPEC QL WET PREP: NORMAL
YEAST GENITAL QL WET PREP: NORMAL

## 2024-12-04 PROCEDURE — 87186 SC STD MICRODIL/AGAR DIL: CPT

## 2024-12-04 PROCEDURE — 81001 URINALYSIS AUTO W/SCOPE: CPT

## 2024-12-04 PROCEDURE — 87086 URINE CULTURE/COLONY COUNT: CPT

## 2024-12-04 PROCEDURE — 87077 CULTURE AEROBIC IDENTIFY: CPT

## 2024-12-04 PROCEDURE — 87210 SMEAR WET MOUNT SALINE/INK: CPT

## 2024-12-04 PROCEDURE — 84703 CHORIONIC GONADOTROPIN ASSAY: CPT

## 2024-12-04 PROCEDURE — 87491 CHLMYD TRACH DNA AMP PROBE: CPT

## 2024-12-04 PROCEDURE — 6370000000 HC RX 637 (ALT 250 FOR IP): Performed by: PHYSICIAN ASSISTANT

## 2024-12-04 PROCEDURE — 87591 N.GONORRHOEAE DNA AMP PROB: CPT

## 2024-12-04 PROCEDURE — 99283 EMERGENCY DEPT VISIT LOW MDM: CPT

## 2024-12-04 RX ORDER — NITROFURANTOIN 25; 75 MG/1; MG/1
100 CAPSULE ORAL ONCE
Status: COMPLETED | OUTPATIENT
Start: 2024-12-04 | End: 2024-12-04

## 2024-12-04 RX ORDER — NITROFURANTOIN 25; 75 MG/1; MG/1
100 CAPSULE ORAL 2 TIMES DAILY
Qty: 20 CAPSULE | Refills: 0 | Status: SHIPPED | OUTPATIENT
Start: 2024-12-04 | End: 2024-12-14

## 2024-12-04 RX ADMIN — NITROFURANTOIN (MONOHYDRATE/MACROCRYSTALS) 100 MG: 25; 75 CAPSULE ORAL at 16:15

## 2024-12-04 SDOH — ECONOMIC STABILITY: FOOD INSECURITY: WITHIN THE PAST 12 MONTHS, THE FOOD YOU BOUGHT JUST DIDN'T LAST AND YOU DIDN'T HAVE MONEY TO GET MORE.: NEVER TRUE

## 2024-12-04 SDOH — ECONOMIC STABILITY: FOOD INSECURITY: WITHIN THE PAST 12 MONTHS, YOU WORRIED THAT YOUR FOOD WOULD RUN OUT BEFORE YOU GOT MONEY TO BUY MORE.: NEVER TRUE

## 2024-12-04 SDOH — ECONOMIC STABILITY: INCOME INSECURITY: IN THE LAST 12 MONTHS, WAS THERE A TIME WHEN YOU WERE NOT ABLE TO PAY THE MORTGAGE OR RENT ON TIME?: NO

## 2024-12-04 SDOH — ECONOMIC STABILITY: TRANSPORTATION INSECURITY
IN THE PAST 12 MONTHS, HAS LACK OF TRANSPORTATION KEPT YOU FROM MEETINGS, WORK, OR FROM GETTING THINGS NEEDED FOR DAILY LIVING?: NO

## 2024-12-04 SDOH — ECONOMIC STABILITY: TRANSPORTATION INSECURITY
IN THE PAST 12 MONTHS, HAS THE LACK OF TRANSPORTATION KEPT YOU FROM MEDICAL APPOINTMENTS OR FROM GETTING MEDICATIONS?: NO

## 2024-12-04 ASSESSMENT — PAIN DESCRIPTION - LOCATION: LOCATION: ABDOMEN

## 2024-12-04 ASSESSMENT — SOCIAL DETERMINANTS OF HEALTH (SDOH): HOW HARD IS IT FOR YOU TO PAY FOR THE VERY BASICS LIKE FOOD, HOUSING, MEDICAL CARE, AND HEATING?: NOT HARD AT ALL

## 2024-12-04 ASSESSMENT — LIFESTYLE VARIABLES
HOW MANY STANDARD DRINKS CONTAINING ALCOHOL DO YOU HAVE ON A TYPICAL DAY: PATIENT DOES NOT DRINK
HOW OFTEN DO YOU HAVE A DRINK CONTAINING ALCOHOL: NEVER

## 2024-12-04 ASSESSMENT — PAIN DESCRIPTION - DESCRIPTORS: DESCRIPTORS: SHARP;PRESSURE

## 2024-12-04 ASSESSMENT — PAIN - FUNCTIONAL ASSESSMENT: PAIN_FUNCTIONAL_ASSESSMENT: 0-10

## 2024-12-04 ASSESSMENT — PAIN SCALES - GENERAL: PAINLEVEL_OUTOF10: 5

## 2024-12-04 NOTE — DISCHARGE INSTRUCTIONS
URINARY TRACT INFECTIONS      Urinary tract infections are very common.  Women are more often affected then men.  Different bacteria can cause urinary tract infections.  The source of most of these organisms is from the patient’s own intestine or groin.      1. To completely clear the infection, take the antibiotic prescribed until all of the pills are gone-- even if you feel better.    2. Drink plenty of fluids.    3. You may continue to have burning or frequent urination for 2 to 3 days after treatment has been started.    4. If burning, frequent urination, or fever continues for greater than 3 days, call your physician or return to the Emergency Department.    5. No alcohol.    6. Drink cranberry juice, grape juice, or other juices daily.    7. Always urinate following sexual intercourse to remove any germs that may remain around the opening to the bladder.    8. Take medication as prescribed by your physician.  Your doctor may have prescribed medicine that will make your urine bright orange, so do not be alarmed if this occurs.

## 2024-12-04 NOTE — ED NOTES
D/C: Order noted for d/c. Pt confirmed d/c paperwork   have correct name. Discharge and education instructions reviewed with patient. Teach-back successful.  Pt verbalized understanding. Pt denied questions at this time. No acute distress noted. Patient instructed to follow-up as noted - return to emergency department if symptoms worsen. Patient verbalized understanding. Discharged per EDMD with discharge instructions. Pt discharged per order to private vehicle. Patient stable upon departure. Thanked patient for choosing [facility] for care.

## 2024-12-04 NOTE — ED TRIAGE NOTES
Chief complaint of lower ab and vaginal pain for about a week.  Urgency to urinate and feels as if bladder does not empty.  Slight white discharge.  Increase in pain with urination. Negative chest pain, SOB, nausea, or flank pain.

## 2024-12-04 NOTE — ED PROVIDER NOTES
[facility]  EMERGENCY DEPARTMENT ENCOUNTER        Pt Name: Danya Tolbert  MRN: [id number]  Birthdate 2005  Date of evaluation: 12/4/2024  Provider: HUNTER Graham  PCP: No primary care provider on file.  Note Started: 3:30 PM EST 12/4/24      DAO. I have evaluated this patient.        CHIEF COMPLAINT       Chief Complaint   Patient presents with    Vaginal Discharge    Abdominal Pain     Lower Ab Pain       HISTORY OF PRESENT ILLNESS: 1 or more Elements     History from : Patient    Limitations to history : None    Danya Tolbert is a 19 y.o. female who presents for evaluation of lower abdominal pain, dysuria, frequency urgency for the last several days.  She denies hematuria.  Endorses chronic low back pain that is unchanged.  Denies fevers denies nausea denies vomiting.  Denies change in vaginal discharge concern for pregnancy or sexually transmitted infection.  She has not tried anything for symptoms.  Has no other acute concerns or complaints at this time.    Nursing Notes were all reviewed and agreed with or any disagreements were addressed in the HPI.    REVIEW OF SYSTEMS :      Review of Systems    Positives and Pertinent negatives as per HPI.     SURGICAL HISTORY   History reviewed. No pertinent surgical history.    CURRENTMEDICATIONS       Discharge Medication List as of 12/4/2024  4:10 PM        CONTINUE these medications which have NOT CHANGED    Details   dicyclomine (BENTYL) 10 MG capsule Take 1 capsule by mouth 4 times daily for 5 days, Disp-20 capsule, R-0Normal      ferrous sulfate (IRON 325) 325 (65 Fe) MG tablet Take 1 tablet by mouth daily (with breakfast), Disp-30 tablet, R-3Normal             ALLERGIES     Latex    FAMILYHISTORY     History reviewed. No pertinent family history.     SOCIAL HISTORY       Social History     Tobacco Use    Smoking status: Never    Smokeless tobacco: Never   Vaping Use    Vaping status: Never Used   Substance Use    Neurological:      Mental Status: She is alert and oriented to person, place, and time.   Psychiatric:         Behavior: Behavior normal.             DIAGNOSTIC RESULTS   LABS:    Labs Reviewed   CULTURE, URINE - Abnormal; Notable for the following components:       Result Value    Urine Culture, Routine   (*)     Value: <10,000 CFU/ml mixed skin/urogenital cynthia. No further workup    Organism Escherichia coli (*)     All other components within normal limits    Narrative:     ORDER#: [id number]                          ORDERED BY: NITHIN LYONS  SOURCE: Urine Clean Catch                  COLLECTED:  12/04/24 15:40  ANTIBIOTICS AT [facility].:                      RECEIVED :  12/04/24 17:35   URINALYSIS WITH REFLEX TO CULTURE - Abnormal; Notable for the following components:    Clarity, UA CLOUDY (*)     Protein, UA 30 (*)     Leukocyte Esterase, Urine SMALL (*)     All other components within normal limits   MICROSCOPIC URINALYSIS - Abnormal; Notable for the following components:    WBC, UA 21-50 (*)     Epithelial Cells, UA 11-20 (*)     Bacteria, UA 1+ (*)     All other components within normal limits   WET PREP, GENITAL   C.TRACHOMATIS N.GONORRHOEAE DNA   PREGNANCY, URINE       When ordered only abnormal lab results are displayed. All other labs were within normal range or not returned as of this dictation.    EKG: When ordered, EKG's are interpreted by the Emergency Department Physician in the absence of a cardiologist.  Please see their note for interpretation of EKG.    RADIOLOGY:   Non-plain film images such as CT, Ultrasound and MRI are read by the radiologist. Plain radiographic images are visualized and preliminarily interpreted by the ED Provider with the below findings:        Interpretation per the Radiologist below, if available at the time of this note:    No orders to display     No results found.    No results found.    PROCEDURES   Unless otherwise noted below, none     Procedures    CRITICAL CARE TIME

## 2024-12-05 LAB
C TRACH DNA CVX QL NAA+PROBE: NEGATIVE
N GONORRHOEA DNA CERV MUCUS QL NAA+PROBE: NEGATIVE

## 2024-12-06 LAB
BACTERIA UR CULT: ABNORMAL
BACTERIA UR CULT: ABNORMAL
ORGANISM: ABNORMAL

## 2025-01-13 ENCOUNTER — APPOINTMENT (OUTPATIENT)
Dept: GENERAL RADIOLOGY | Age: 20
End: 2025-01-13
Payer: COMMERCIAL

## 2025-01-13 ENCOUNTER — HOSPITAL ENCOUNTER (EMERGENCY)
Age: 20
Discharge: HOME | End: 2025-01-13
Attending: STUDENT IN AN ORGANIZED HEALTH CARE EDUCATION/TRAINING PROGRAM
Payer: COMMERCIAL

## 2025-01-13 VITALS
WEIGHT: 261.25 LBS | HEIGHT: 64 IN | RESPIRATION RATE: 16 BRPM | TEMPERATURE: 98.2 F | SYSTOLIC BLOOD PRESSURE: 125 MMHG | BODY MASS INDEX: 44.6 KG/M2 | HEART RATE: 100 BPM | DIASTOLIC BLOOD PRESSURE: 70 MMHG | OXYGEN SATURATION: 97 %

## 2025-01-13 DIAGNOSIS — S83.92XA SPRAIN OF LEFT KNEE, UNSPECIFIED LIGAMENT, INITIAL ENCOUNTER: Primary | ICD-10-CM

## 2025-01-13 PROCEDURE — 73560 X-RAY EXAM OF KNEE 1 OR 2: CPT

## 2025-01-13 PROCEDURE — 99283 EMERGENCY DEPT VISIT LOW MDM: CPT

## 2025-01-13 PROCEDURE — 6370000000 HC RX 637 (ALT 250 FOR IP): Performed by: STUDENT IN AN ORGANIZED HEALTH CARE EDUCATION/TRAINING PROGRAM

## 2025-01-13 RX ORDER — IBUPROFEN 400 MG/1
800 TABLET, FILM COATED ORAL ONCE
Status: COMPLETED | OUTPATIENT
Start: 2025-01-13 | End: 2025-01-13

## 2025-01-13 RX ORDER — ACETAMINOPHEN 325 MG/1
650 TABLET ORAL ONCE
Status: COMPLETED | OUTPATIENT
Start: 2025-01-13 | End: 2025-01-13

## 2025-01-13 RX ADMIN — IBUPROFEN 800 MG: 400 TABLET, FILM COATED ORAL at 17:23

## 2025-01-13 RX ADMIN — ACETAMINOPHEN 650 MG: 325 TABLET ORAL at 17:23

## 2025-01-13 ASSESSMENT — PAIN DESCRIPTION - LOCATION: LOCATION: LEG

## 2025-01-13 ASSESSMENT — PAIN DESCRIPTION - ORIENTATION: ORIENTATION: LEFT

## 2025-01-13 ASSESSMENT — PAIN - FUNCTIONAL ASSESSMENT: PAIN_FUNCTIONAL_ASSESSMENT: 0-10

## 2025-01-13 ASSESSMENT — PAIN SCALES - GENERAL: PAINLEVEL_OUTOF10: 7

## 2025-01-13 NOTE — ED PROVIDER NOTES
[facility] EMERGENCY DEPARTMENT      EMERGENCY MEDICINE     Pt Name: Danya Tolbert  MRN: [id number]  Birthdate 2005  Date of evaluation: 2025  Provider: Cullen Ford MD    CHIEF COMPLAINT       Chief Complaint   Patient presents with    Fall     Pt to ED with CC of fall after slipping.  Verbalizes L leg pain.  Denies hitting head or syncope.     HISTORY OF PRESENT ILLNESS   Danya Tolbert is a 19 y.o. female who presents to the emergency department for trip and fall landing on her left knee.  No head or neck injury.  No prior injuries to her left knee, no prior surgeries to left knee        PASTMEDICAL HISTORY   History reviewed. No pertinent past medical history.    Patient Active Problem List   Diagnosis Code    Elevated blood pressure reading R03.0    Obesity in pregnancy O99.210    Right upper quadrant pain R10.11    Encounter for induction of labor Z34.90     (normal spontaneous vaginal delivery) O80     SURGICAL HISTORY     History reviewed. No pertinent surgical history.    CURRENT MEDICATIONS       Previous Medications    DICYCLOMINE (BENTYL) 10 MG CAPSULE    Take 1 capsule by mouth 4 times daily for 5 days    FERROUS SULFATE (IRON 325) 325 (65 FE) MG TABLET    Take 1 tablet by mouth daily (with breakfast)       ALLERGIES     is allergic to latex.    FAMILY HISTORY     has no family status information on file.        SOCIAL HISTORY       Social History     Tobacco Use    Smoking status: Never    Smokeless tobacco: Never   Vaping Use    Vaping status: Never Used   Substance Use Topics    Alcohol use: Never    Drug use: Never       PHYSICAL EXAM       ED Triage Vitals [25 1652]   BP Systolic BP Percentile Diastolic BP Percentile Temp Temp Source Pulse Respirations SpO2   125/70 -- -- 98.2 °F (36.8 °C) Oral 100 16 97 %      Height Weight - Scale         1.626 m (5' 4\") 118.5 kg (261 lb 3.9 oz)               Physical Exam  No acute distress, morbidly obese, PT

## 2025-01-13 NOTE — DISCHARGE INSTRUCTIONS
You may use Tylenol and ibuprofen for pain control.   a knee brace over-the-counter to help provide any with some support.  Follow-up with the orthopedic provider given to you for not improving in the next 3 to 4 days.  Return if develop any other new or worsening symptoms.

## 2025-01-14 ENCOUNTER — TELEPHONE (OUTPATIENT)
Dept: ORTHOPEDIC SURGERY | Age: 20
End: 2025-01-14

## 2025-01-14 NOTE — TELEPHONE ENCOUNTER
Did leave message regarding ED referral for an appointment. Upon return call please schedule with Dr. Mcgrath.

## 2025-04-17 NOTE — PROGRESS NOTES
4/17/2025  Ivonne Villeda  [address]  [facility] [address]    Dear Ms. Villeda,  Your procedure is scheduled with Dr Lema on May 6, 2025 at 7:45 am at:    [facility]  [address] [place]  [address]  [phone]  Please take elevator (to the left of the Main Entrance) to 2nd Floor (Surgery Center)    Please register at [facility] on May 6, 2025 at 6:45 am.    You can expect to be contacted  prior to the surgery to confirm arrival and surgery time. These times may change due to various OR schedule needs. We will call you ASAP if this happens.    The following appointment(s) have been scheduled for you:     Post-op with Rama Merlos  at the [facility] ([address] [place] [address]) on June 2, 2025 at 8:30 am.    To better prepare for your surgery, please follow these instructions:    You May Eat a light breakfast or lunch before procedure.    You May Drive yourself to Procedure if you wish        Hold NAPROXEN for 4 days prior to procedure until 24 hours following.    Hold CELEBREX for 4 days prior to procedure until 24 hours following.  Hold IBUPROFEN for 24 hours prior to procedure until 24 hours following.    Hold DICLOFENAC GEL & PATCH for 24 hours prior to procedure until 24 hours following.    You do NOT need to stop 81mg ASPIRIN for your procedure.  NSAIDS (OTC products)? - Hold for 24 hours                We understand that unplanned events may cause you to miss your appointments. If you are unable to attend the appointment for your procedure, give us at least 24 hours of notice.  Please be aware that if you miss your procedure appointment without notifying us in advance, we may no longer be able to serve you as your pain management provider. “  If you have any scheduling questions or need to reschedule, please contact me at the telephone number and extension listed below.   If you become sick, are having fevers,  Return OB Office Visit    CC:   Chief Complaint   Patient presents with    Routine Prenatal Visit       HPI:  Pt seen and examined. No concerns/complaints. Denies VB, LOF, ctx. +FM.     Objective:  /68   Pulse (!) 114   Wt 236 lb (107 kg)   LMP 10/27/2022   BMI 38.85 kg/m²   Gen: AO, NAD  Abd: Soft, NT  FHT: 141    Assessment/Plan:  21 y.o.  at 28w1d (Estimated Date of Delivery: 23) presents for JACOBY appointment:     Problem List Items Addressed This Visit          Other    Prenatal care in third trimester - Primary     - FWB: reassuring by dat today  - Genetic screening: MQS low risk  - Anatomy scan: 3/15/23: 283g, nl anatomy (suboptimal n/l, AA, DA, 3VV, hands), CL 3.17cm, nl fluid, posterior placenta/no previa    - 23: 713g (40%ile), suboptimal AA, DA, 3VV    - 5/15/23: 1214g (45%ile), nl completion anatomy  - Tdap: 5/15/23  - PNL: O+/ab-, RI, HepB/C neg, HIVnr, RPRnr, VZV immune, UDS neg, Hgb , GCCT neg, UCx no growth    - CBC/GTT sent today          Relevant Orders    Glucose Challenge Gestational    CBC with Auto Differential    Maternal obesity syndrome     Prepregnancy BMI 40  - MFM referral for anatomy scan  - see above  - Recommend TWG 11-20lbs  - Nutritional counseling  - Early glucose screening 108, repeat at next visit  - q4wk growth US and weekly ANFS at 34 weeks for BMI > 40    - 5/15/23: 1214g (45%ile)  - baseline HELLP labs wnl, on daily baby aspirin as well             Dispo: RTC in 2 weeks  Rebecca Singer MD are on antibiotics, or are experiencing illness of any type, please call and alert us as your procedure will need to be rescheduled.    Thank you,  Lori at [phone]  Surgery Scheduler for Dr Torey Martinez [facility]

## 2025-05-02 ENCOUNTER — OFFICE VISIT (OUTPATIENT)
Dept: ORTHOPEDIC SURGERY | Age: 20
End: 2025-05-02
Payer: COMMERCIAL

## 2025-05-02 VITALS — BODY MASS INDEX: 44.65 KG/M2 | WEIGHT: 268 LBS | HEIGHT: 65 IN

## 2025-05-02 DIAGNOSIS — M25.562 CHRONIC PAIN OF LEFT KNEE: Primary | ICD-10-CM

## 2025-05-02 DIAGNOSIS — G89.29 CHRONIC PAIN OF LEFT KNEE: Primary | ICD-10-CM

## 2025-05-02 PROCEDURE — 99203 OFFICE O/P NEW LOW 30 MIN: CPT | Performed by: PHYSICIAN ASSISTANT

## 2025-05-02 PROCEDURE — 1036F TOBACCO NON-USER: CPT | Performed by: PHYSICIAN ASSISTANT

## 2025-05-02 PROCEDURE — G8427 DOCREV CUR MEDS BY ELIG CLIN: HCPCS | Performed by: PHYSICIAN ASSISTANT

## 2025-05-02 PROCEDURE — G8417 CALC BMI ABV UP PARAM F/U: HCPCS | Performed by: PHYSICIAN ASSISTANT

## 2025-05-02 RX ORDER — METHYLPREDNISOLONE 4 MG/1
TABLET ORAL
Qty: 1 KIT | Refills: 1 | Status: SHIPPED | OUTPATIENT
Start: 2025-05-02 | End: 2025-05-08

## 2025-05-02 NOTE — PROGRESS NOTES
This dictation was done with [employer] dictation and may contain mechanical errors related to translation.    I have today reviewed with Danya Tolbert the clinically relevant, past medical history, medications, allergies, family history, social history, and Review Of Systems form the patient’s most recent history form & I have documented any details relevant to today's presenting complaints in my history below. Ms. Danya Tolbert's self-reported past medical history, medications, allergies, family history, social history, and Review Of Systems form has been scanned into the chart under the \"Media\" tab.    Subjective:  Danya Tolbert is a 19 y.o. who is here as a new patient to [facility] orthopedics complaining of pain in her left knee since the beginning of the year.  She actually had a slip and fall on 2025 and went to the emergency department she was taking some anti-inflammatories and the pain has been off-and-on but can be bad as a 9 out of 10 on the medial and the lateral side and affects her overall ability to get around.  She made an appointment with us because it has become more consistent and her pain is increasing so we sent her for an AP lateral sunrise view and tunnel view x-ray of her left knee today      Patient Active Problem List   Diagnosis    Elevated blood pressure reading    Obesity in pregnancy    Right upper quadrant pain    Encounter for induction of labor     (normal spontaneous vaginal delivery)           Current Outpatient Medications on File Prior to Visit   Medication Sig Dispense Refill    ferrous sulfate (IRON 325) 325 (65 Fe) MG tablet Take 1 tablet by mouth daily (with breakfast) 30 tablet 3    dicyclomine (BENTYL) 10 MG capsule Take 1 capsule by mouth 4 times daily for 5 days 20 capsule 0     No current facility-administered medications on file prior to visit.         Objective:   Height 1.638 m (5' 4.5\"), weight 121.6 kg (268 lb), not currently breastfeeding.    On examination

## 2025-05-08 NOTE — PLAN OF CARE
Goals: To be achieved in:4 weeks  1Independent in HEP and progression per patient tolerance, in order to prevent re-injury.   [] Progressing: [] Met: [] Not Met: [] Adjusted  Patient will have a decrease in pain to <1/10 to facilitate improvement in movement, function, and ADLs as indicated by Functional Deficits.  [] Progressing: [] Met: [] Not Met: [] Adjusted    IF APPLICABLE:  [] Patient to demonstrate independence in wear and care for custom orthotic device. (Only if applicable for orthotic eval)     Long Term Goals: To be achieved in: 8 weeks  Disability index score of 14or less for the WOMAC to assist with reaching prior level of function with activities such as walk and do adl's.  [] Progressing: [] Met: [] Not Met: [] Adjusted  Patient will demonstrate increased AROM of left knee to wfl without pain to allow for proper joint functioning to enable patient to bend and squat.   [] Progressing: [] Met: [] Not Met: [] Adjusted  Patient will demonstrate increased Strength of left le  to at least 4+/5 throughout without pain to allow for proper functional mobility to enable patient to return to iadl's.   [] Progressing: [] Met: [] Not Met: [] Adjusted  Patient will return to steps  without increased symptoms or restriction.   [] Progressing: [] Met: [] Not Met: [] Adjusted  \" I want to be able to do my daily chores without more pain   [] Progressing: [] Met: [] Not Met: [] Adjusted         Overall Progression Towards Functional goals/ Treatment Progress Update:  [] Patient is progressing as expected towards functional goals listed.    [] Progression is slowed due to complexities/Impairments listed.  [] Progression has been slowed due to co-morbidities.  [x] Plan just implemented, too soon (<30days) to assess goals progression   [] Goals require adjustment due to lack of progress  [] Patient is not progressing as expected and requires additional follow up with physician  [] Other:     TREATMENT PLAN

## 2025-05-14 ENCOUNTER — HOSPITAL ENCOUNTER (OUTPATIENT)
Dept: PHYSICAL THERAPY | Age: 20
Setting detail: THERAPIES SERIES
Discharge: HOME | End: 2025-05-14
Payer: COMMERCIAL

## 2025-05-14 DIAGNOSIS — R26.2 DIFFICULTY WALKING: Primary | ICD-10-CM

## 2025-05-14 DIAGNOSIS — R68.89 DECREASED FUNCTIONAL ACTIVITY TOLERANCE: ICD-10-CM

## 2025-05-14 DIAGNOSIS — R68.89 DECREASED EXERCISE TOLERANCE: ICD-10-CM

## 2025-05-14 DIAGNOSIS — R68.89 DECREASED ABILITY TO PERFORM ACTIVITIES: ICD-10-CM

## 2025-05-14 DIAGNOSIS — R26.89 DECREASED FUNCTIONAL MOBILITY: ICD-10-CM

## 2025-05-14 DIAGNOSIS — R53.1 FUNCTIONAL WEAKNESS: ICD-10-CM

## 2025-05-14 DIAGNOSIS — Z78.9 NEED FOR HOME EXERCISE PROGRAM: ICD-10-CM

## 2025-05-14 PROCEDURE — 97161 PT EVAL LOW COMPLEX 20 MIN: CPT | Performed by: PHYSICAL THERAPIST

## 2025-05-14 PROCEDURE — 97530 THERAPEUTIC ACTIVITIES: CPT | Performed by: PHYSICAL THERAPIST

## 2025-05-14 PROCEDURE — 97110 THERAPEUTIC EXERCISES: CPT | Performed by: PHYSICAL THERAPIST

## 2025-05-21 ENCOUNTER — HOSPITAL ENCOUNTER (OUTPATIENT)
Dept: PHYSICAL THERAPY | Age: 20
Setting detail: THERAPIES SERIES
Discharge: HOME | End: 2025-05-21
Payer: COMMERCIAL

## 2025-05-21 PROCEDURE — 97140 MANUAL THERAPY 1/> REGIONS: CPT | Performed by: PHYSICAL THERAPIST

## 2025-05-21 PROCEDURE — 97110 THERAPEUTIC EXERCISES: CPT | Performed by: PHYSICAL THERAPIST

## 2025-05-21 PROCEDURE — 97112 NEUROMUSCULAR REEDUCATION: CPT | Performed by: PHYSICAL THERAPIST

## 2025-05-21 NOTE — FLOWSHEET NOTE
[facility] - Outpatient Rehabilitation and Therapy: [address], [address], [place] [address] office: [phone] fax: [phone]         Physical Therapy: TREATMENT/PROGRESS NOTE   Patient: Danya Tolbert (19 y.o. female)   Examination Date: 2025   :  2005 MRN: [id number]   Visit #:   Insurance Allowable Auth Needed   30 [x]Yes    []No After 30      Insurance: Payor: [employer] / Plan: [employer] [place] MEDICAID / Product Type: *No Product type* /   Insurance ID: [id number] - (Medicaid Managed)  Secondary Insurance (if applicable):    Treatment Diagnosis:     ICD-10-CM    1. Difficulty walking  R26.2       2. Decreased functional mobility  R26.89       3. Decreased functional activity tolerance  R68.89       4. Functional weakness  R53.1       5. Decreased ability to perform activities  R68.89       6. Need for home exercise program  Z78.9       7. Decreased exercise tolerance  R68.89          Medical Diagnosis:  Chronic pain of left knee [M25.562, G89.29]   Referring Physician: Milan Ruvalcaba PA  PCP: No primary care provider on file.     Plan of care signed (Y/N): routed    Date of Patient follow up with Physician:      Plan of Care Report: EVAL today  POC update due: (10 visits /OR AUTH LIMITS, whichever is less)  25                                            Medical History:  Morbid obesity                                         Precautions/ Contra-indications:           Latex allergy:  NO  Pacemaker:    NO  Contraindications for Manipulation: None  Date of Injury: 25  Other:    Red Flags:  None    Suicide Screening:   The patient did not verbalize a primary behavioral concern, suicidal ideation, suicidal intent, or demonstrate suicidal behaviors.    Preferred Language for Healthcare:   [x] English       [] other:    SUBJECTIVE EXAMINATION     Patient stated complaint: Pt is a  20 y/o female who slipped and hit her left knee on 25 and has had pain

## 2025-05-27 ENCOUNTER — HOSPITAL ENCOUNTER (EMERGENCY)
Age: 20
Discharge: HOME | End: 2025-05-28
Attending: EMERGENCY MEDICINE
Payer: COMMERCIAL

## 2025-05-27 DIAGNOSIS — R07.9 CHEST PAIN, UNSPECIFIED TYPE: Primary | ICD-10-CM

## 2025-05-27 PROCEDURE — 99284 EMERGENCY DEPT VISIT MOD MDM: CPT

## 2025-05-27 PROCEDURE — 93005 ELECTROCARDIOGRAM TRACING: CPT | Performed by: EMERGENCY MEDICINE

## 2025-05-27 ASSESSMENT — PAIN SCALES - GENERAL: PAINLEVEL_OUTOF10: 3

## 2025-05-27 ASSESSMENT — PAIN - FUNCTIONAL ASSESSMENT: PAIN_FUNCTIONAL_ASSESSMENT: 0-10

## 2025-05-28 ENCOUNTER — APPOINTMENT (OUTPATIENT)
Dept: GENERAL RADIOLOGY | Age: 20
End: 2025-05-28
Payer: COMMERCIAL

## 2025-05-28 VITALS
HEART RATE: 74 BPM | OXYGEN SATURATION: 100 % | TEMPERATURE: 98.2 F | WEIGHT: 273.81 LBS | BODY MASS INDEX: 45.62 KG/M2 | DIASTOLIC BLOOD PRESSURE: 71 MMHG | SYSTOLIC BLOOD PRESSURE: 122 MMHG | HEIGHT: 65 IN | RESPIRATION RATE: 15 BRPM

## 2025-05-28 PROCEDURE — 6370000000 HC RX 637 (ALT 250 FOR IP): Performed by: EMERGENCY MEDICINE

## 2025-05-28 PROCEDURE — 71045 X-RAY EXAM CHEST 1 VIEW: CPT

## 2025-05-28 RX ORDER — MAG HYDROX/ALUMINUM HYD/SIMETH 400-400-40
30 SUSPENSION, ORAL (FINAL DOSE FORM) ORAL EVERY 6 HOURS PRN
Qty: 360 ML | Refills: 1 | Status: SHIPPED | OUTPATIENT
Start: 2025-05-28

## 2025-05-28 RX ADMIN — LIDOCAINE HYDROCHLORIDE: 20 SOLUTION ORAL at 00:43

## 2025-05-28 ASSESSMENT — PAIN - FUNCTIONAL ASSESSMENT: PAIN_FUNCTIONAL_ASSESSMENT: NONE - DENIES PAIN

## 2025-05-28 ASSESSMENT — PAIN SCALES - GENERAL: PAINLEVEL_OUTOF10: 0

## 2025-05-28 NOTE — ED PROVIDER NOTES
troponin: +0 for negative troponin    Heart score: 1.  This falls under the following category: Score of 0-3, which indicates a very low risk for major adverse cardiac event and supports early discharge    Is this patient to be included in the SEP-1 Core Measure due to severe sepsis or septic shock?   No   Exclusion criteria - the patient is NOT to be included for SEP-1 Core Measure due to:  Infection is not suspected    Chronic Conditions: Obesity    I am the primary physician of Record.     FINAL IMPRESSION    1. Chest pain, unspecified type         DISPOSITION/PLAN   DISPOSITION Decision To Discharge 05/28/2025 12:32:03 AM   DISPOSITION CONDITION Stable            PATIENT REFERRED TO:   [facility], Internal Medicine Residency Practice  [address]  [place] [address]  [phone]  Schedule an appointment as soon as possible for a visit in 2 days      [facility]  [address] [address]  [phone]  Go to   immediately if symptoms worsen     DISCHARGE MEDICATIONS:   Discharge Medication List as of 5/28/2025 12:44 AM        START taking these medications    Details   aluminum & magnesium hydroxide-simethicone (MAALOX MAX) 400-400-40 MG/5ML SUSP Take 30 mLs by mouth every 6 hours as needed (abdominal pain), Disp-360 mL, R-1Normal            DISCONTINUED MEDICATIONS:   Discharge Medication List as of 5/28/2025 12:44 AM               (Please note that portions of this note were completed with a voice recognition program.  Efforts were made to edit the dictations but occasionally words are mis-transcribed.)     Patrick Carmen MD (electronically signed)                 Patrick Carmen MD  05/28/25 [id number]

## 2025-05-29 ENCOUNTER — HOSPITAL ENCOUNTER (OUTPATIENT)
Dept: PHYSICAL THERAPY | Age: 20
Setting detail: THERAPIES SERIES
End: 2025-05-29
Payer: COMMERCIAL

## 2025-05-29 LAB
EKG ATRIAL RATE: 88 BPM
EKG DIAGNOSIS: NORMAL
EKG P AXIS: 33 DEGREES
EKG P-R INTERVAL: 156 MS
EKG Q-T INTERVAL: 360 MS
EKG QRS DURATION: 88 MS
EKG QTC CALCULATION (BAZETT): 435 MS
EKG R AXIS: 12 DEGREES
EKG T AXIS: 10 DEGREES
EKG VENTRICULAR RATE: 88 BPM

## 2025-05-29 PROCEDURE — 93010 ELECTROCARDIOGRAM REPORT: CPT | Performed by: INTERNAL MEDICINE

## 2025-06-03 ENCOUNTER — HOSPITAL ENCOUNTER (OUTPATIENT)
Dept: PHYSICAL THERAPY | Age: 20
Setting detail: THERAPIES SERIES
Discharge: HOME | End: 2025-06-03
Payer: COMMERCIAL

## 2025-06-03 PROCEDURE — 97140 MANUAL THERAPY 1/> REGIONS: CPT | Performed by: PHYSICAL THERAPIST

## 2025-06-03 PROCEDURE — 97110 THERAPEUTIC EXERCISES: CPT | Performed by: PHYSICAL THERAPIST

## 2025-06-03 PROCEDURE — 97112 NEUROMUSCULAR REEDUCATION: CPT | Performed by: PHYSICAL THERAPIST

## 2025-06-03 NOTE — FLOWSHEET NOTE
[facility] - Outpatient Rehabilitation and Therapy: [address], [address], [place] [address] office: [phone] fax: [phone]         Physical Therapy: TREATMENT/PROGRESS NOTE   Patient: Danya Tolbert (19 y.o. female)   Examination Date: 2025   :  2005 MRN: [id number]   Visit #: 3 /16  Insurance Allowable Auth Needed   30 [x]Yes    []No After 30      Insurance: Payor: [employer] / Plan: [employer] [place] MEDICAID / Product Type: *No Product type* /   Insurance ID: [id number] - (Medicaid Managed)  Secondary Insurance (if applicable):    Treatment Diagnosis:     ICD-10-CM    1. Difficulty walking  R26.2       2. Decreased functional mobility  R26.89       3. Decreased functional activity tolerance  R68.89       4. Functional weakness  R53.1       5. Decreased ability to perform activities  R68.89       6. Need for home exercise program  Z78.9       7. Decreased exercise tolerance  R68.89          Medical Diagnosis:  Chronic pain of left knee [M25.562, G89.29]   Referring Physician: Milan Ruvalcaba PA  PCP: No primary care provider on file.     Plan of care signed (Y/N): routed    Date of Patient follow up with Physician:      Plan of Care Report: EVAL today  POC update due: (10 visits /OR AUTH LIMITS, whichever is less)  25                                            Medical History:  Morbid obesity                                         Precautions/ Contra-indications:           Latex allergy:  NO  Pacemaker:    NO  Contraindications for Manipulation: None  Date of Injury: 25  Other:    Red Flags:  None    Suicide Screening:   The patient did not verbalize a primary behavioral concern, suicidal ideation, suicidal intent, or demonstrate suicidal behaviors.    Preferred Language for Healthcare:   [x] English       [] other:    SUBJECTIVE EXAMINATION     Patient stated complaint: Pt is a  18 y/o female who slipped and hit her left knee on 25 and has had pain

## 2025-06-05 ENCOUNTER — APPOINTMENT (OUTPATIENT)
Dept: PHYSICAL THERAPY | Age: 20
End: 2025-06-05
Payer: COMMERCIAL

## 2025-06-10 ENCOUNTER — HOSPITAL ENCOUNTER (OUTPATIENT)
Dept: PHYSICAL THERAPY | Age: 20
Setting detail: THERAPIES SERIES
End: 2025-06-10
Payer: COMMERCIAL

## 2025-06-12 ENCOUNTER — APPOINTMENT (OUTPATIENT)
Dept: PHYSICAL THERAPY | Age: 20
End: 2025-06-12
Payer: COMMERCIAL

## 2025-06-17 ENCOUNTER — HOSPITAL ENCOUNTER (OUTPATIENT)
Dept: PHYSICAL THERAPY | Age: 20
Setting detail: THERAPIES SERIES
End: 2025-06-17
Payer: COMMERCIAL

## 2025-06-19 ENCOUNTER — APPOINTMENT (OUTPATIENT)
Dept: PHYSICAL THERAPY | Age: 20
End: 2025-06-19
Payer: COMMERCIAL